# Patient Record
Sex: MALE | Race: WHITE | Employment: OTHER | URBAN - METROPOLITAN AREA
[De-identification: names, ages, dates, MRNs, and addresses within clinical notes are randomized per-mention and may not be internally consistent; named-entity substitution may affect disease eponyms.]

---

## 2024-08-14 RX ORDER — SODIUM CHLORIDE, SODIUM LACTATE, POTASSIUM CHLORIDE, CALCIUM CHLORIDE 600; 310; 30; 20 MG/100ML; MG/100ML; MG/100ML; MG/100ML
INJECTION, SOLUTION INTRAVENOUS CONTINUOUS
Status: CANCELLED | OUTPATIENT
Start: 2024-08-14

## 2024-08-14 NOTE — DISCHARGE INSTRUCTIONS
Pre-operative Instructions           NOTHING to eat or drink after midnight the night prior to surgery   (This includes gum, candy, mints, chewing tobacco, etc). Smoking cessation is always advised.   (Follow bowel prep or diet instructions as/if instructed by your surgeon.)    Please arrive at the surgery center (Entrance B) by 10:30 AM on 8/19/2024 (or as directed by your surgeon's office).  See Directons to Surgery Center on next page.     Please take only the following medication(s) the day of surgery with a small sip of water: gabapentin (Neurontin), metoprolol (Lopressor)    No trulicity injection within one week of surgery.     Please hold lisinopril morning of surgery, or night prior to surgery if you take it at night.    If applicable:   -Use/bring daily inhalers with you   -Do not take diabetic medications on the day of surgery.    Please stop any blood thinning medications  AS DIRECTED BY PRESCRIBING PROVIDER: aspirin and effient    Per your cardiologist: Okay to hold aspirin and Effient 5-7 days prior to surgery     Failure to stop these medications as instructed (too soon or too late) may result in injury to you, or your surgery may need to be rescheduled.   Below is a list of some examples for your reference. This is not an all-inclusive list and if you have any questions/concerns, please check with your surgeon's office.     Antiplatelets : (stop blood cells (called platelets) from sticking together and forming a blood clot):   Aspirin (Bufferin, Ecotrin), Clopidogrel (Plavix), Ticagrelor (Brilinta), Prasugrel (Effient), Dipyridamole/aspirin (Aggrenox), Ticlopidine (Ticlid), Eptifibatide (Integrilin)    Anticoagulants: (slow down your body's process of making clots):   Warfarin (Coumadin), Rivaroxaban (Xarelto), Dabigatran (Pradaxa), Apixaban (Eliquis), Edoxaban (Savaysa), Heparin, Enoxaparin (Lovenox), Fondaparinux (Arixtra)    NSAIDS: Aspirin (Bufferin, Ecotrin), Ibuprofen (Motrin, Nuprin,Advil),

## 2024-08-15 ENCOUNTER — HOSPITAL ENCOUNTER (OUTPATIENT)
Dept: PREADMISSION TESTING | Age: 79
Discharge: HOME | End: 2024-08-15
Payer: MEDICARE

## 2024-08-15 VITALS
TEMPERATURE: 98.2 F | WEIGHT: 198 LBS | HEIGHT: 74 IN | HEART RATE: 83 BPM | SYSTOLIC BLOOD PRESSURE: 119 MMHG | OXYGEN SATURATION: 95 % | RESPIRATION RATE: 16 BRPM | DIASTOLIC BLOOD PRESSURE: 64 MMHG | BODY MASS INDEX: 25.41 KG/M2

## 2024-08-15 LAB
ABO + RH BLD: NORMAL
ANION GAP SERPL CALCULATED.3IONS-SCNC: 8 MMOL/L (ref 9–16)
ARM BAND NUMBER: NORMAL
BLOOD BANK SAMPLE EXPIRATION: NORMAL
BLOOD GROUP ANTIBODIES SERPL: NEGATIVE
BUN SERPL-MCNC: 32 MG/DL (ref 8–23)
CHLORIDE SERPL-SCNC: 106 MMOL/L (ref 98–107)
CO2 SERPL-SCNC: 27 MMOL/L (ref 20–31)
CREAT SERPL-MCNC: 2.6 MG/DL (ref 0.7–1.2)
ERYTHROCYTE [DISTWIDTH] IN BLOOD BY AUTOMATED COUNT: 15.7 % (ref 11.8–14.4)
GFR, ESTIMATED: 24 ML/MIN/1.73M2
GLUCOSE SERPL-MCNC: 194 MG/DL (ref 74–99)
HCT VFR BLD AUTO: 36.9 % (ref 40.7–50.3)
HGB BLD-MCNC: 12.5 G/DL (ref 13–17)
MCH RBC QN AUTO: 31.8 PG (ref 25.2–33.5)
MCHC RBC AUTO-ENTMCNC: 33.9 G/DL (ref 28.4–34.8)
MCV RBC AUTO: 93.9 FL (ref 82.6–102.9)
NRBC BLD-RTO: 0 PER 100 WBC
PLATELET # BLD AUTO: 205 K/UL (ref 138–453)
PMV BLD AUTO: 9.2 FL (ref 8.1–13.5)
POTASSIUM SERPL-SCNC: 4.7 MMOL/L (ref 3.7–5.3)
RBC # BLD AUTO: 3.93 M/UL (ref 4.21–5.77)
SODIUM SERPL-SCNC: 141 MMOL/L (ref 136–145)
WBC OTHER # BLD: 6.9 K/UL (ref 3.5–11.3)

## 2024-08-15 PROCEDURE — 85027 COMPLETE CBC AUTOMATED: CPT

## 2024-08-15 PROCEDURE — 80051 ELECTROLYTE PANEL: CPT

## 2024-08-15 PROCEDURE — 86900 BLOOD TYPING SEROLOGIC ABO: CPT

## 2024-08-15 PROCEDURE — 82565 ASSAY OF CREATININE: CPT

## 2024-08-15 PROCEDURE — 82947 ASSAY GLUCOSE BLOOD QUANT: CPT

## 2024-08-15 PROCEDURE — 86901 BLOOD TYPING SEROLOGIC RH(D): CPT

## 2024-08-15 PROCEDURE — 84520 ASSAY OF UREA NITROGEN: CPT

## 2024-08-15 PROCEDURE — 93005 ELECTROCARDIOGRAM TRACING: CPT | Performed by: ANESTHESIOLOGY

## 2024-08-15 PROCEDURE — 86850 RBC ANTIBODY SCREEN: CPT

## 2024-08-15 PROCEDURE — 87086 URINE CULTURE/COLONY COUNT: CPT

## 2024-08-15 RX ORDER — ASPIRIN 81 MG/1
81 TABLET ORAL
Status: ON HOLD | COMMUNITY
Start: 2015-08-07

## 2024-08-15 RX ORDER — INSULIN LISPRO 100 [IU]/ML
INJECTION, SOLUTION INTRAVENOUS; SUBCUTANEOUS
Status: ON HOLD | COMMUNITY
Start: 2017-05-23

## 2024-08-15 RX ORDER — DULAGLUTIDE 0.75 MG/.5ML
0.75 INJECTION, SOLUTION SUBCUTANEOUS WEEKLY
Status: ON HOLD | COMMUNITY
Start: 2023-05-12

## 2024-08-15 RX ORDER — LISINOPRIL 5 MG/1
5 TABLET ORAL DAILY
Status: ON HOLD | COMMUNITY
Start: 2024-05-08

## 2024-08-15 RX ORDER — PRASUGREL 10 MG/1
1 TABLET, FILM COATED ORAL EVERY MORNING
Status: ON HOLD | COMMUNITY
Start: 2024-04-25

## 2024-08-15 RX ORDER — GABAPENTIN 600 MG/1
600 TABLET ORAL 2 TIMES DAILY
Status: ON HOLD | COMMUNITY
Start: 2024-02-12

## 2024-08-15 RX ORDER — NITROGLYCERIN 0.4 MG/1
0.4 TABLET SUBLINGUAL EVERY 5 MIN PRN
Status: ON HOLD | COMMUNITY
Start: 2022-03-11

## 2024-08-15 RX ORDER — INSULIN GLARGINE 100 [IU]/ML
20 INJECTION, SOLUTION SUBCUTANEOUS 2 TIMES DAILY
Status: ON HOLD | COMMUNITY
Start: 2024-08-05

## 2024-08-15 NOTE — PROGRESS NOTES
Anesthesia Focused Assessment    Hx of anesthesia complications:  no  Family hx of anesthesia complications:  no      Tested positive for Covid-19 in last 8 weeks: no  Upper respiratory infection within the last 4 weeks: no      STOP-BANG Sleep Apnea Questionnaire    SNORE loudly (heard through closed doors)?   Yes  TIRED, fatigued, sleepy during daytime?    Yes  OBSERVED stopping breathing during sleep?   Yes  High blood PRESSURE or being treated?    Yes    BMI over 35?        No  AGE over 50?        Yes  NECK circumference over 16\"?     No  GENDER (male)?       Yes             Total 6  High risk 5-8  Intermediate risk 3-4  Low risk 0-2    ----------------------------------------------------------------------------------------------------------------------  NICOLE                              Yes  If yes, machine?      No    DM1                                            No  DM2                   Yes, on meds    Coronary Artery Disease      Yes, 1 stent placed in 2022  HTN         Yes, on meds  Defib/AICD/Pacemaker               No             Renal Failure                   No  If yes, on dialysis           Active smoker?       No  Drinks alcohol?       No  Illicit drugs?        No  Dentition?        benign      Past Medical History:   Diagnosis Date    Angina pectoris (HCC)     Arthritis     ASCVD (arteriosclerotic cardiovascular disease)     CAD (coronary artery disease)     CKD (chronic kidney disease), stage III (HCC)     First degree AV block     Hypertension     Kidney stone     Left renal mass 08/2024    Mixed hyperlipidemia     Murmur, cardiac     Right upper lobe pneumonia 06/2024    [facility]    Shortness of breath     Sleep apnea     no CPAP    Status post insertion of drug-eluting stent into left anterior descending (LAD) artery 03/28/2022    Type 2 diabetes mellitus with diabetic chronic kidney disease (HCC)     managed by PCP    Under care of team     Cardiologist: Parth Kwok MD,

## 2024-08-15 NOTE — H&P (VIEW-ONLY)
History and Physical    Pt Name: Randy Caputo  MRN: [id number]  YOB: 1945  Date of evaluation: 8/15/2024  Primary Care Physician: Humberto Craig MD    SUBJECTIVE:   History of Chief Complaint:    Randy Caputo is a 79 y.o. male who presents for PAT appointment. Patient complains of hematuria, ongoing for about two weeks, as well as left abdominal discomfort. He denies any urinary frequency, urgency, or flank pain. He state on imaging study, he was found to have kidney stones. Patient has been scheduled for     XI ROBOTIC LAPAROSCOPIC RADICAL NEPHRECTOMY, CYSTOSCOPY LEFT STENT REMOVAL     Allergies  has No Known Allergies.  Medications  Prior to Admission medications    Medication Sig Start Date End Date Taking? Authorizing Provider   aspirin (ASPIRIN 81) 81 MG EC tablet 1 tablet 8/7/15  Yes Efrain Swift MD   gabapentin (NEURONTIN) 600 MG tablet Take 1 tablet by mouth 2 times daily. 2/12/24  Yes Efrain Swift MD   dulaglutide (TRULICITY) 0.75 MG/0.5ML SOPN SC injection Inject 0.5 mLs into the skin once a week 5/12/23  Yes Efrain Swift MD   ATORVASTATIN CALCIUM PO Take 10 mg by mouth daily 2/20/14  Yes Efrain Swift MD   vitamin D (D3) 25 MCG (1000 UT) CAPS Take by mouth daily   Yes Efrain Swift MD   metoprolol tartrate (LOPRESSOR) 25 MG tablet Take 0.5 tablets by mouth 2 times daily Hold is systolic BP is less than 110 8/5/24  Yes Efrain Swift MD   lisinopril (PRINIVIL;ZESTRIL) 5 MG tablet Take 1 tablet by mouth daily 5/8/24  Yes Efrain Swift MD   insulin glargine (LANTUS) 100 UNIT/ML injection vial Inject 20 Units into the skin 2 times daily 8/5/24  Yes Efrain Swift MD   insulin lispro, 1 Unit Dial, (HUMALOG/ADMELOG) 100 UNIT/ML SOPN Inject into the skin 3 times daily (before meals) Sliding scale 5/23/17  Yes Efrain Swift MD   nitroGLYCERIN (NITROSTAT) 0.4 MG SL tablet Place 1 tablet under the tongue every 5 minutes

## 2024-08-15 NOTE — H&P
History and Physical    Pt Name: Randy Caputo  MRN: [id number]  YOB: 1945  Date of evaluation: 8/15/2024  Primary Care Physician: Humberto Craig MD    SUBJECTIVE:   History of Chief Complaint:    Randy Caputo is a 79 y.o. male who presents for PAT appointment. Patient complains of hematuria, ongoing for about two weeks, as well as left abdominal discomfort. He denies any urinary frequency, urgency, or flank pain. He state on imaging study, he was found to have kidney stones. Patient has been scheduled for     XI ROBOTIC LAPAROSCOPIC RADICAL NEPHRECTOMY, CYSTOSCOPY LEFT STENT REMOVAL     Allergies  has No Known Allergies.  Medications  Prior to Admission medications    Medication Sig Start Date End Date Taking? Authorizing Provider   aspirin (ASPIRIN 81) 81 MG EC tablet 1 tablet 8/7/15  Yes Efrain Swift MD   gabapentin (NEURONTIN) 600 MG tablet Take 1 tablet by mouth 2 times daily. 2/12/24  Yes Efrain Swift MD   dulaglutide (TRULICITY) 0.75 MG/0.5ML SOPN SC injection Inject 0.5 mLs into the skin once a week 5/12/23  Yes Efrain Swift MD   ATORVASTATIN CALCIUM PO Take 10 mg by mouth daily 2/20/14  Yes Efrain Swift MD   vitamin D (D3) 25 MCG (1000 UT) CAPS Take by mouth daily   Yes Efrain Swift MD   metoprolol tartrate (LOPRESSOR) 25 MG tablet Take 0.5 tablets by mouth 2 times daily Hold is systolic BP is less than 110 8/5/24  Yes Efrain Swift MD   lisinopril (PRINIVIL;ZESTRIL) 5 MG tablet Take 1 tablet by mouth daily 5/8/24  Yes Efrain Swift MD   insulin glargine (LANTUS) 100 UNIT/ML injection vial Inject 20 Units into the skin 2 times daily 8/5/24  Yes Efrain Swift MD   insulin lispro, 1 Unit Dial, (HUMALOG/ADMELOG) 100 UNIT/ML SOPN Inject into the skin 3 times daily (before meals) Sliding scale 5/23/17  Yes Efrain Swift MD   nitroGLYCERIN (NITROSTAT) 0.4 MG SL tablet Place 1 tablet under the tongue every 5 minutes

## 2024-08-16 LAB
EKG ATRIAL RATE: 78 BPM
EKG P AXIS: 39 DEGREES
EKG P-R INTERVAL: 226 MS
EKG Q-T INTERVAL: 410 MS
EKG QRS DURATION: 86 MS
EKG QTC CALCULATION (BAZETT): 467 MS
EKG R AXIS: 57 DEGREES
EKG T AXIS: 55 DEGREES
EKG VENTRICULAR RATE: 78 BPM
MICROORGANISM SPEC CULT: NORMAL
SPECIMEN DESCRIPTION: NORMAL

## 2024-08-16 RX ORDER — ENOXAPARIN SODIUM 100 MG/ML
40 INJECTION SUBCUTANEOUS ONCE
Status: CANCELLED | OUTPATIENT
Start: 2024-08-16 | End: 2024-08-16

## 2024-08-19 ENCOUNTER — ANESTHESIA (OUTPATIENT)
Dept: OPERATING ROOM | Age: 79
End: 2024-08-19
Payer: MEDICARE

## 2024-08-19 ENCOUNTER — ANESTHESIA EVENT (OUTPATIENT)
Dept: OPERATING ROOM | Age: 79
End: 2024-08-19
Payer: MEDICARE

## 2024-08-19 ENCOUNTER — HOSPITAL ENCOUNTER (INPATIENT)
Age: 79
LOS: 8 days | Discharge: NURSING, REHAB OR LONG-TERM CARE | DRG: 657 | End: 2024-08-27
Attending: UROLOGY | Admitting: UROLOGY
Payer: MEDICARE

## 2024-08-19 DIAGNOSIS — G89.18 POST-OP PAIN: Primary | ICD-10-CM

## 2024-08-19 DIAGNOSIS — N28.89 RENAL MASS: ICD-10-CM

## 2024-08-19 LAB
ANION GAP SERPL CALCULATED.3IONS-SCNC: 16 MMOL/L (ref 9–16)
BUN SERPL-MCNC: 34 MG/DL (ref 8–23)
CALCIUM SERPL-MCNC: 7.9 MG/DL (ref 8.6–10.4)
CHLORIDE SERPL-SCNC: 105 MMOL/L (ref 98–107)
CO2 SERPL-SCNC: 13 MMOL/L (ref 20–31)
CREAT SERPL-MCNC: 2.6 MG/DL (ref 0.7–1.2)
ERYTHROCYTE [DISTWIDTH] IN BLOOD BY AUTOMATED COUNT: 15.7 % (ref 11.8–14.4)
GFR, ESTIMATED: 25 ML/MIN/1.73M2
GLUCOSE BLD-MCNC: 189 MG/DL (ref 74–100)
GLUCOSE BLD-MCNC: 226 MG/DL (ref 75–110)
GLUCOSE SERPL-MCNC: 226 MG/DL (ref 74–99)
HCT VFR BLD AUTO: 41.6 % (ref 40.7–50.3)
HGB BLD-MCNC: 12.2 G/DL (ref 13–17)
MCH RBC QN AUTO: 30.7 PG (ref 25.2–33.5)
MCHC RBC AUTO-ENTMCNC: 29.3 G/DL (ref 28.4–34.8)
MCV RBC AUTO: 104.5 FL (ref 82.6–102.9)
NRBC BLD-RTO: 0 PER 100 WBC
PLATELET # BLD AUTO: 198 K/UL (ref 138–453)
PMV BLD AUTO: 9.2 FL (ref 8.1–13.5)
POTASSIUM BLD-SCNC: 4.4 MMOL/L (ref 3.5–4.5)
POTASSIUM SERPL-SCNC: 5.3 MMOL/L (ref 3.7–5.3)
RBC # BLD AUTO: 3.98 M/UL (ref 4.21–5.77)
SODIUM SERPL-SCNC: 134 MMOL/L (ref 136–145)
WBC OTHER # BLD: 13.5 K/UL (ref 3.5–11.3)

## 2024-08-19 PROCEDURE — 6360000002 HC RX W HCPCS

## 2024-08-19 PROCEDURE — 88341 IMHCHEM/IMCYTCHM EA ADD ANTB: CPT

## 2024-08-19 PROCEDURE — S2900 ROBOTIC SURGICAL SYSTEM: HCPCS | Performed by: UROLOGY

## 2024-08-19 PROCEDURE — 3600000019 HC SURGERY ROBOT ADDTL 15MIN: Performed by: UROLOGY

## 2024-08-19 PROCEDURE — 80048 BASIC METABOLIC PNL TOTAL CA: CPT

## 2024-08-19 PROCEDURE — 6360000002 HC RX W HCPCS: Performed by: UROLOGY

## 2024-08-19 PROCEDURE — 6370000000 HC RX 637 (ALT 250 FOR IP)

## 2024-08-19 PROCEDURE — 2709999900 HC NON-CHARGEABLE SUPPLY: Performed by: UROLOGY

## 2024-08-19 PROCEDURE — 0TP98DZ REMOVAL OF INTRALUMINAL DEVICE FROM URETER, VIA NATURAL OR ARTIFICIAL OPENING ENDOSCOPIC: ICD-10-PCS | Performed by: UROLOGY

## 2024-08-19 PROCEDURE — 2580000003 HC RX 258

## 2024-08-19 PROCEDURE — 8E0W4CZ ROBOTIC ASSISTED PROCEDURE OF TRUNK REGION, PERCUTANEOUS ENDOSCOPIC APPROACH: ICD-10-PCS | Performed by: UROLOGY

## 2024-08-19 PROCEDURE — 3700000001 HC ADD 15 MINUTES (ANESTHESIA): Performed by: UROLOGY

## 2024-08-19 PROCEDURE — 2720000010 HC SURG SUPPLY STERILE: Performed by: UROLOGY

## 2024-08-19 PROCEDURE — 7100000000 HC PACU RECOVERY - FIRST 15 MIN: Performed by: UROLOGY

## 2024-08-19 PROCEDURE — 2500000003 HC RX 250 WO HCPCS

## 2024-08-19 PROCEDURE — 85027 COMPLETE CBC AUTOMATED: CPT

## 2024-08-19 PROCEDURE — 84132 ASSAY OF SERUM POTASSIUM: CPT

## 2024-08-19 PROCEDURE — 3700000000 HC ANESTHESIA ATTENDED CARE: Performed by: UROLOGY

## 2024-08-19 PROCEDURE — 88342 IMHCHEM/IMCYTCHM 1ST ANTB: CPT

## 2024-08-19 PROCEDURE — 82947 ASSAY GLUCOSE BLOOD QUANT: CPT

## 2024-08-19 PROCEDURE — 2060000000 HC ICU INTERMEDIATE R&B

## 2024-08-19 PROCEDURE — 3600000009 HC SURGERY ROBOT BASE: Performed by: UROLOGY

## 2024-08-19 PROCEDURE — 88307 TISSUE EXAM BY PATHOLOGIST: CPT

## 2024-08-19 PROCEDURE — 2580000003 HC RX 258: Performed by: UROLOGY

## 2024-08-19 PROCEDURE — 0TT14ZZ RESECTION OF LEFT KIDNEY, PERCUTANEOUS ENDOSCOPIC APPROACH: ICD-10-PCS | Performed by: UROLOGY

## 2024-08-19 PROCEDURE — 2580000003 HC RX 258: Performed by: ANESTHESIOLOGY

## 2024-08-19 PROCEDURE — 7100000001 HC PACU RECOVERY - ADDTL 15 MIN: Performed by: UROLOGY

## 2024-08-19 PROCEDURE — 88313 SPECIAL STAINS GROUP 2: CPT

## 2024-08-19 RX ORDER — ENOXAPARIN SODIUM 100 MG/ML
40 INJECTION SUBCUTANEOUS ONCE
Status: DISCONTINUED | OUTPATIENT
Start: 2024-08-19 | End: 2024-08-19

## 2024-08-19 RX ORDER — INSULIN LISPRO 100 [IU]/ML
0-8 INJECTION, SOLUTION INTRAVENOUS; SUBCUTANEOUS
Status: DISCONTINUED | OUTPATIENT
Start: 2024-08-19 | End: 2024-08-27 | Stop reason: HOSPADM

## 2024-08-19 RX ORDER — CEFAZOLIN SODIUM 1 G/3ML
INJECTION, POWDER, FOR SOLUTION INTRAMUSCULAR; INTRAVENOUS PRN
Status: DISCONTINUED | OUTPATIENT
Start: 2024-08-19 | End: 2024-08-19 | Stop reason: SDUPTHER

## 2024-08-19 RX ORDER — IPRATROPIUM BROMIDE AND ALBUTEROL SULFATE 2.5; .5 MG/3ML; MG/3ML
1 SOLUTION RESPIRATORY (INHALATION)
Status: DISCONTINUED | OUTPATIENT
Start: 2024-08-19 | End: 2024-08-19 | Stop reason: HOSPADM

## 2024-08-19 RX ORDER — ONDANSETRON 4 MG/1
4 TABLET, ORALLY DISINTEGRATING ORAL EVERY 8 HOURS PRN
Status: DISCONTINUED | OUTPATIENT
Start: 2024-08-19 | End: 2024-08-27 | Stop reason: HOSPADM

## 2024-08-19 RX ORDER — LABETALOL HYDROCHLORIDE 5 MG/ML
10 INJECTION, SOLUTION INTRAVENOUS
Status: DISCONTINUED | OUTPATIENT
Start: 2024-08-19 | End: 2024-08-19 | Stop reason: HOSPADM

## 2024-08-19 RX ORDER — HEPARIN SODIUM 5000 [USP'U]/ML
5000 INJECTION, SOLUTION INTRAVENOUS; SUBCUTANEOUS ONCE
Status: COMPLETED | OUTPATIENT
Start: 2024-08-19 | End: 2024-08-19

## 2024-08-19 RX ORDER — POTASSIUM CHLORIDE 1500 MG/1
40 TABLET, EXTENDED RELEASE ORAL PRN
Status: DISCONTINUED | OUTPATIENT
Start: 2024-08-19 | End: 2024-08-19

## 2024-08-19 RX ORDER — GLYCOPYRROLATE 1 MG/5 ML
SYRINGE (ML) INTRAVENOUS PRN
Status: DISCONTINUED | OUTPATIENT
Start: 2024-08-19 | End: 2024-08-19 | Stop reason: SDUPTHER

## 2024-08-19 RX ORDER — SODIUM CHLORIDE, SODIUM LACTATE, POTASSIUM CHLORIDE, CALCIUM CHLORIDE 600; 310; 30; 20 MG/100ML; MG/100ML; MG/100ML; MG/100ML
INJECTION, SOLUTION INTRAVENOUS CONTINUOUS
Status: DISCONTINUED | OUTPATIENT
Start: 2024-08-19 | End: 2024-08-19 | Stop reason: HOSPADM

## 2024-08-19 RX ORDER — SODIUM CHLORIDE 9 MG/ML
INJECTION, SOLUTION INTRAVENOUS CONTINUOUS
Status: DISCONTINUED | OUTPATIENT
Start: 2024-08-19 | End: 2024-08-27 | Stop reason: HOSPADM

## 2024-08-19 RX ORDER — MINERAL OIL AND WHITE PETROLATUM 150; 830 MG/G; MG/G
OINTMENT OPHTHALMIC PRN
Status: DISCONTINUED | OUTPATIENT
Start: 2024-08-19 | End: 2024-08-27 | Stop reason: HOSPADM

## 2024-08-19 RX ORDER — SODIUM CHLORIDE 9 MG/ML
INJECTION, SOLUTION INTRAVENOUS CONTINUOUS PRN
Status: DISCONTINUED | OUTPATIENT
Start: 2024-08-19 | End: 2024-08-19 | Stop reason: SDUPTHER

## 2024-08-19 RX ORDER — OXYCODONE HYDROCHLORIDE 5 MG/1
10 TABLET ORAL EVERY 4 HOURS PRN
Status: DISCONTINUED | OUTPATIENT
Start: 2024-08-19 | End: 2024-08-27 | Stop reason: HOSPADM

## 2024-08-19 RX ORDER — SODIUM CHLORIDE 9 MG/ML
INJECTION, SOLUTION INTRAVENOUS PRN
Status: DISCONTINUED | OUTPATIENT
Start: 2024-08-19 | End: 2024-08-19 | Stop reason: HOSPADM

## 2024-08-19 RX ORDER — ONDANSETRON 2 MG/ML
INJECTION INTRAMUSCULAR; INTRAVENOUS PRN
Status: DISCONTINUED | OUTPATIENT
Start: 2024-08-19 | End: 2024-08-19 | Stop reason: SDUPTHER

## 2024-08-19 RX ORDER — PHENYLEPHRINE HCL IN 0.9% NACL 1 MG/10 ML
SYRINGE (ML) INTRAVENOUS PRN
Status: DISCONTINUED | OUTPATIENT
Start: 2024-08-19 | End: 2024-08-19 | Stop reason: SDUPTHER

## 2024-08-19 RX ORDER — BUPIVACAINE HYDROCHLORIDE 5 MG/ML
INJECTION, SOLUTION EPIDURAL; INTRACAUDAL PRN
Status: DISCONTINUED | OUTPATIENT
Start: 2024-08-19 | End: 2024-08-19 | Stop reason: HOSPADM

## 2024-08-19 RX ORDER — OXYCODONE HYDROCHLORIDE 5 MG/1
5 TABLET ORAL EVERY 4 HOURS PRN
Status: DISCONTINUED | OUTPATIENT
Start: 2024-08-19 | End: 2024-08-27 | Stop reason: HOSPADM

## 2024-08-19 RX ORDER — HYDRALAZINE HYDROCHLORIDE 20 MG/ML
10 INJECTION INTRAMUSCULAR; INTRAVENOUS
Status: DISCONTINUED | OUTPATIENT
Start: 2024-08-19 | End: 2024-08-19 | Stop reason: HOSPADM

## 2024-08-19 RX ORDER — MORPHINE SULFATE 2 MG/ML
2 INJECTION, SOLUTION INTRAMUSCULAR; INTRAVENOUS
Status: DISCONTINUED | OUTPATIENT
Start: 2024-08-19 | End: 2024-08-27 | Stop reason: HOSPADM

## 2024-08-19 RX ORDER — DOCUSATE SODIUM 100 MG/1
100 CAPSULE, LIQUID FILLED ORAL 2 TIMES DAILY
Status: DISCONTINUED | OUTPATIENT
Start: 2024-08-19 | End: 2024-08-27 | Stop reason: HOSPADM

## 2024-08-19 RX ORDER — DIPHENHYDRAMINE HYDROCHLORIDE 50 MG/ML
12.5 INJECTION INTRAMUSCULAR; INTRAVENOUS
Status: DISCONTINUED | OUTPATIENT
Start: 2024-08-19 | End: 2024-08-19 | Stop reason: HOSPADM

## 2024-08-19 RX ORDER — GABAPENTIN 600 MG/1
600 TABLET ORAL 2 TIMES DAILY
Status: DISCONTINUED | OUTPATIENT
Start: 2024-08-19 | End: 2024-08-23

## 2024-08-19 RX ORDER — HYDRALAZINE HYDROCHLORIDE 20 MG/ML
5 INJECTION INTRAMUSCULAR; INTRAVENOUS EVERY 6 HOURS PRN
Status: DISCONTINUED | OUTPATIENT
Start: 2024-08-19 | End: 2024-08-27 | Stop reason: HOSPADM

## 2024-08-19 RX ORDER — SODIUM CHLORIDE 0.9 % (FLUSH) 0.9 %
5-40 SYRINGE (ML) INJECTION PRN
Status: DISCONTINUED | OUTPATIENT
Start: 2024-08-19 | End: 2024-08-19 | Stop reason: HOSPADM

## 2024-08-19 RX ORDER — NALOXONE HYDROCHLORIDE 0.4 MG/ML
INJECTION, SOLUTION INTRAMUSCULAR; INTRAVENOUS; SUBCUTANEOUS PRN
Status: DISCONTINUED | OUTPATIENT
Start: 2024-08-19 | End: 2024-08-19 | Stop reason: HOSPADM

## 2024-08-19 RX ORDER — SODIUM CHLORIDE 9 MG/ML
INJECTION, SOLUTION INTRAVENOUS PRN
Status: DISCONTINUED | OUTPATIENT
Start: 2024-08-19 | End: 2024-08-27 | Stop reason: HOSPADM

## 2024-08-19 RX ORDER — PROPOFOL 10 MG/ML
INJECTION, EMULSION INTRAVENOUS PRN
Status: DISCONTINUED | OUTPATIENT
Start: 2024-08-19 | End: 2024-08-19 | Stop reason: SDUPTHER

## 2024-08-19 RX ORDER — INSULIN GLARGINE 100 [IU]/ML
20 INJECTION, SOLUTION SUBCUTANEOUS 2 TIMES DAILY
Status: DISCONTINUED | OUTPATIENT
Start: 2024-08-19 | End: 2024-08-27 | Stop reason: HOSPADM

## 2024-08-19 RX ORDER — SODIUM CHLORIDE 0.9 % (FLUSH) 0.9 %
5-40 SYRINGE (ML) INJECTION EVERY 12 HOURS SCHEDULED
Status: DISCONTINUED | OUTPATIENT
Start: 2024-08-19 | End: 2024-08-19 | Stop reason: HOSPADM

## 2024-08-19 RX ORDER — METOPROLOL TARTRATE 25 MG/1
12.5 TABLET, FILM COATED ORAL 2 TIMES DAILY
Status: DISCONTINUED | OUTPATIENT
Start: 2024-08-19 | End: 2024-08-27 | Stop reason: HOSPADM

## 2024-08-19 RX ORDER — FENTANYL CITRATE 50 UG/ML
INJECTION, SOLUTION INTRAMUSCULAR; INTRAVENOUS PRN
Status: DISCONTINUED | OUTPATIENT
Start: 2024-08-19 | End: 2024-08-19 | Stop reason: SDUPTHER

## 2024-08-19 RX ORDER — LIDOCAINE HYDROCHLORIDE 10 MG/ML
INJECTION, SOLUTION EPIDURAL; INFILTRATION; INTRACAUDAL; PERINEURAL PRN
Status: DISCONTINUED | OUTPATIENT
Start: 2024-08-19 | End: 2024-08-19 | Stop reason: SDUPTHER

## 2024-08-19 RX ORDER — METOPROLOL TARTRATE 1 MG/ML
5 INJECTION, SOLUTION INTRAVENOUS EVERY 6 HOURS PRN
Status: DISCONTINUED | OUTPATIENT
Start: 2024-08-19 | End: 2024-08-27 | Stop reason: HOSPADM

## 2024-08-19 RX ORDER — ONDANSETRON 2 MG/ML
4 INJECTION INTRAMUSCULAR; INTRAVENOUS EVERY 6 HOURS PRN
Status: DISCONTINUED | OUTPATIENT
Start: 2024-08-19 | End: 2024-08-27 | Stop reason: HOSPADM

## 2024-08-19 RX ORDER — ROCURONIUM BROMIDE 10 MG/ML
INJECTION, SOLUTION INTRAVENOUS PRN
Status: DISCONTINUED | OUTPATIENT
Start: 2024-08-19 | End: 2024-08-19 | Stop reason: SDUPTHER

## 2024-08-19 RX ORDER — INSULIN LISPRO 100 [IU]/ML
0-4 INJECTION, SOLUTION INTRAVENOUS; SUBCUTANEOUS NIGHTLY
Status: DISCONTINUED | OUTPATIENT
Start: 2024-08-19 | End: 2024-08-27 | Stop reason: HOSPADM

## 2024-08-19 RX ORDER — METHOCARBAMOL 500 MG/1
500 TABLET, FILM COATED ORAL 4 TIMES DAILY
Status: DISCONTINUED | OUTPATIENT
Start: 2024-08-19 | End: 2024-08-27 | Stop reason: HOSPADM

## 2024-08-19 RX ORDER — MAGNESIUM HYDROXIDE 1200 MG/15ML
LIQUID ORAL CONTINUOUS PRN
Status: DISCONTINUED | OUTPATIENT
Start: 2024-08-19 | End: 2024-08-19 | Stop reason: HOSPADM

## 2024-08-19 RX ORDER — DEXAMETHASONE SODIUM PHOSPHATE 10 MG/ML
INJECTION INTRAMUSCULAR; INTRAVENOUS PRN
Status: DISCONTINUED | OUTPATIENT
Start: 2024-08-19 | End: 2024-08-19 | Stop reason: SDUPTHER

## 2024-08-19 RX ORDER — POTASSIUM CHLORIDE 7.45 MG/ML
10 INJECTION INTRAVENOUS PRN
Status: DISCONTINUED | OUTPATIENT
Start: 2024-08-19 | End: 2024-08-19

## 2024-08-19 RX ORDER — SODIUM CHLORIDE 0.9 % (FLUSH) 0.9 %
5-40 SYRINGE (ML) INJECTION PRN
Status: DISCONTINUED | OUTPATIENT
Start: 2024-08-19 | End: 2024-08-27 | Stop reason: HOSPADM

## 2024-08-19 RX ORDER — SODIUM CHLORIDE 0.9 % (FLUSH) 0.9 %
5-40 SYRINGE (ML) INJECTION EVERY 12 HOURS SCHEDULED
Status: DISCONTINUED | OUTPATIENT
Start: 2024-08-19 | End: 2024-08-27 | Stop reason: HOSPADM

## 2024-08-19 RX ORDER — LANOLIN ALCOHOL/MO/W.PET/CERES
6 CREAM (GRAM) TOPICAL NIGHTLY PRN
Status: DISCONTINUED | OUTPATIENT
Start: 2024-08-19 | End: 2024-08-27 | Stop reason: HOSPADM

## 2024-08-19 RX ORDER — PROCHLORPERAZINE EDISYLATE 5 MG/ML
5 INJECTION INTRAMUSCULAR; INTRAVENOUS
Status: DISCONTINUED | OUTPATIENT
Start: 2024-08-19 | End: 2024-08-19 | Stop reason: HOSPADM

## 2024-08-19 RX ORDER — CALCIUM CARBONATE 500 MG/1
500 TABLET, CHEWABLE ORAL 3 TIMES DAILY PRN
Status: DISCONTINUED | OUTPATIENT
Start: 2024-08-19 | End: 2024-08-27 | Stop reason: HOSPADM

## 2024-08-19 RX ADMIN — ONDANSETRON 4 MG: 2 INJECTION INTRAMUSCULAR; INTRAVENOUS at 15:30

## 2024-08-19 RX ADMIN — HYOSCYAMINE SULFATE 125 MCG: 0.12 TABLET SUBLINGUAL at 21:12

## 2024-08-19 RX ADMIN — Medication 100 MCG: at 14:25

## 2024-08-19 RX ADMIN — INSULIN LISPRO 2 UNITS: 100 INJECTION, SOLUTION INTRAVENOUS; SUBCUTANEOUS at 21:14

## 2024-08-19 RX ADMIN — ROCURONIUM BROMIDE 20 MG: 10 INJECTION, SOLUTION INTRAVENOUS at 14:48

## 2024-08-19 RX ADMIN — ROCURONIUM BROMIDE 30 MG: 10 INJECTION, SOLUTION INTRAVENOUS at 12:34

## 2024-08-19 RX ADMIN — METOPROLOL TARTRATE 12.5 MG: 25 TABLET, FILM COATED ORAL at 22:56

## 2024-08-19 RX ADMIN — ROCURONIUM BROMIDE 20 MG: 10 INJECTION, SOLUTION INTRAVENOUS at 13:11

## 2024-08-19 RX ADMIN — METHOCARBAMOL 500 MG: 500 TABLET ORAL at 21:31

## 2024-08-19 RX ADMIN — SODIUM CHLORIDE, POTASSIUM CHLORIDE, SODIUM LACTATE AND CALCIUM CHLORIDE: 600; 310; 30; 20 INJECTION, SOLUTION INTRAVENOUS at 11:29

## 2024-08-19 RX ADMIN — MORPHINE SULFATE 2 MG: 2 INJECTION, SOLUTION INTRAMUSCULAR; INTRAVENOUS at 23:39

## 2024-08-19 RX ADMIN — Medication 100 MCG: at 13:53

## 2024-08-19 RX ADMIN — ROCURONIUM BROMIDE 20 MG: 10 INJECTION, SOLUTION INTRAVENOUS at 14:15

## 2024-08-19 RX ADMIN — FENTANYL CITRATE 50 MCG: 50 INJECTION, SOLUTION INTRAMUSCULAR; INTRAVENOUS at 15:11

## 2024-08-19 RX ADMIN — ROCURONIUM BROMIDE 10 MG: 10 INJECTION, SOLUTION INTRAVENOUS at 13:29

## 2024-08-19 RX ADMIN — ROCURONIUM BROMIDE 40 MG: 10 INJECTION, SOLUTION INTRAVENOUS at 11:47

## 2024-08-19 RX ADMIN — DOCUSATE SODIUM 100 MG: 100 CAPSULE, LIQUID FILLED ORAL at 21:32

## 2024-08-19 RX ADMIN — LIDOCAINE HYDROCHLORIDE 50 MG: 10 INJECTION, SOLUTION EPIDURAL; INFILTRATION; INTRACAUDAL; PERINEURAL at 11:47

## 2024-08-19 RX ADMIN — SODIUM CHLORIDE: 9 INJECTION, SOLUTION INTRAVENOUS at 13:54

## 2024-08-19 RX ADMIN — FENTANYL CITRATE 50 MCG: 50 INJECTION, SOLUTION INTRAMUSCULAR; INTRAVENOUS at 13:58

## 2024-08-19 RX ADMIN — CEFAZOLIN 2 G: 1 INJECTION, POWDER, FOR SOLUTION INTRAMUSCULAR; INTRAVENOUS at 12:00

## 2024-08-19 RX ADMIN — PROPOFOL 200 MG: 10 INJECTION, EMULSION INTRAVENOUS at 11:47

## 2024-08-19 RX ADMIN — Medication 100 MCG: at 11:57

## 2024-08-19 RX ADMIN — SUGAMMADEX 400 MG: 100 INJECTION, SOLUTION INTRAVENOUS at 15:49

## 2024-08-19 RX ADMIN — FENTANYL CITRATE 100 MCG: 50 INJECTION, SOLUTION INTRAMUSCULAR; INTRAVENOUS at 11:47

## 2024-08-19 RX ADMIN — Medication 100 MCG: at 14:09

## 2024-08-19 RX ADMIN — SODIUM CHLORIDE: 9 INJECTION, SOLUTION INTRAVENOUS at 11:54

## 2024-08-19 RX ADMIN — HEPARIN SODIUM 5000 UNITS: 5000 INJECTION INTRAVENOUS; SUBCUTANEOUS at 11:32

## 2024-08-19 RX ADMIN — ROCURONIUM BROMIDE 10 MG: 10 INJECTION, SOLUTION INTRAVENOUS at 12:01

## 2024-08-19 RX ADMIN — Medication 100 MCG: at 12:09

## 2024-08-19 RX ADMIN — GABAPENTIN 600 MG: 600 TABLET, FILM COATED ORAL at 21:09

## 2024-08-19 RX ADMIN — DEXAMETHASONE SODIUM PHOSPHATE 10 MG: 10 INJECTION INTRAMUSCULAR; INTRAVENOUS at 12:01

## 2024-08-19 RX ADMIN — Medication 100 MCG: at 14:56

## 2024-08-19 RX ADMIN — INSULIN LISPRO 2 UNITS: 100 INJECTION, SOLUTION INTRAVENOUS; SUBCUTANEOUS at 17:54

## 2024-08-19 RX ADMIN — Medication 0.2 MG: at 12:33

## 2024-08-19 RX ADMIN — Medication 100 MCG: at 13:22

## 2024-08-19 RX ADMIN — OXYCODONE HYDROCHLORIDE 10 MG: 5 TABLET ORAL at 21:14

## 2024-08-19 RX ADMIN — Medication 100 MCG: at 13:37

## 2024-08-19 ASSESSMENT — PAIN DESCRIPTION - LOCATION
LOCATION: ABDOMEN

## 2024-08-19 ASSESSMENT — PAIN DESCRIPTION - DESCRIPTORS
DESCRIPTORS: CRAMPING
DESCRIPTORS: CRAMPING
DESCRIPTORS: ACHING

## 2024-08-19 ASSESSMENT — ENCOUNTER SYMPTOMS: SHORTNESS OF BREATH: 1

## 2024-08-19 ASSESSMENT — PAIN - FUNCTIONAL ASSESSMENT
PAIN_FUNCTIONAL_ASSESSMENT: NONE - DENIES PAIN
PAIN_FUNCTIONAL_ASSESSMENT: FACE, LEGS, ACTIVITY, CRY, AND CONSOLABILITY (FLACC)

## 2024-08-19 ASSESSMENT — PAIN SCALES - GENERAL
PAINLEVEL_OUTOF10: 4
PAINLEVEL_OUTOF10: 10
PAINLEVEL_OUTOF10: 9

## 2024-08-19 ASSESSMENT — PAIN DESCRIPTION - ORIENTATION
ORIENTATION: LOWER;LEFT
ORIENTATION: LEFT;LOWER;MID
ORIENTATION: LEFT;LOWER

## 2024-08-19 NOTE — OP NOTE
Operative Note      Patient: Randy Caputo  YOB: 1945  MRN: [id number]    Date of Procedure: 8/19/2024    Pre-Op Diagnosis Codes:      * Renal mass [N28.89]    Post-Op Diagnosis: Same       Procedures:  Cystoscopy  Left ureteral stent removal  Left robotic radical nephrectomy    Surgeon(s):  Chester Hargrove MD    Assistant:   First Assistant: Ashley Hidalgo  Resident: Gabe Mayo MD; Rocco Power MD    Anesthesia: General    Estimated Blood Loss (mL): 200     Complications: None    Specimens:   ID Type Source Tests Collected by Time Destination   A : LEFT KIDNEY Tissue Kidney SURGICAL PATHOLOGY Chester Hargrove MD 8/19/2024 1512        Implants:  * No implants in log *      Drains:   Urinary Catheter 08/19/24 Pittman (Active)       Findings:  7 cm upper pole left renal mass, radical nephrectomy performed without complications, adrenal sparing, good plane between kidney and adrenal gland.  Ureteral stent removed prior to nephrectomy.  Overall, 2 main renal arteries and veins, both taken with staple loads, large parasitic vein which was also taken with a staple load.  The ureter and lower pole were also stapled.  Overall, good hemostasis noted, Surgicel powder used around the upper pole.    Indication:  79-year-old male with a large left renal mass.  Decision was made for robotic assisted left radical nephrectomy after ureteral stent removal.  After risks, benefits, alternatives were discussed and the patient agreed to move forward with the procedures listed above.    Detailed Description of Procedure:   The patient was transferred from the preoperative holding area to the operative suite in supine position, placed on the operating table, general anesthesia induced, 5000 units subcutaneous heparin administered as well as 2 g of Ancef.  EPC cuffs on and running.  He was placed in dorsal lithotomy position, genitals prepped and draped in normal sterile fashion, timeout confirmed proper patient

## 2024-08-19 NOTE — ANESTHESIA POSTPROCEDURE EVALUATION
Department of Anesthesiology  Postprocedure Note    Patient: Randy Caputo  MRN: [id number]  YOB: 1945  Date of evaluation: 8/19/2024    Procedure Summary       Date: 08/19/24 Room / Location: [address]    Anesthesia Start: 1142 Anesthesia Stop: 1605    Procedures:       XI ROBOTIC LAPAROSCOPIC RADICAL NEPHRECTOMY (Kidney)      CYSTOSCOPY STENT REMOVAL (Left: Kidney) Diagnosis:       Renal mass      (Renal mass [N28.89])    Surgeons: Chester Hargrove MD Responsible Provider: Chrissy Pruitt MD    Anesthesia Type: general ASA Status: 3            Anesthesia Type: No value filed.    Aby Phase I: Aby Score: 8    Aby Phase II:      Anesthesia Post Evaluation    Patient location during evaluation: bedside  Patient participation: complete - patient participated  Level of consciousness: awake and awake and alert  Pain score: 1  Airway patency: patent  Nausea & Vomiting: no nausea and no vomiting  Cardiovascular status: blood pressure returned to baseline and hemodynamically stable  Respiratory status: acceptable  Hydration status: euvolemic  Pain management: adequate    No notable events documented.

## 2024-08-19 NOTE — ANESTHESIA PRE PROCEDURE
Department of Anesthesiology  Preprocedure Note       Name:  Randy Caputo   Age:  79 y.o.  :  1945                                          MRN:  [id number]         Date:  2024      Surgeon: Surgeon(s):  Chester Hargrove MD    Procedure: Procedure(s):  XI ROBOTIC LAPAROSCOPIC RADICAL NEPHRECTOMY  CYSTOSCOPY STENT REMOVAL    Medications prior to admission:   Prior to Admission medications    Medication Sig Start Date End Date Taking? Authorizing Provider   aspirin (ASPIRIN 81) 81 MG EC tablet 1 tablet Pt. stopped 24 for OR 24. 8/7/15  Yes Efrain Swift MD   gabapentin (NEURONTIN) 600 MG tablet Take 1 tablet by mouth 2 times daily. 24  Yes Efrain Swift MD   dulaglutide (TRULICITY) 0.75 MG/0.5ML SOPN SC injection Inject 0.5 mLs into the skin once a week 23  Yes Efrain Swift MD   ATORVASTATIN CALCIUM PO Take 10 mg by mouth daily 14  Yes ProviderEfrain MD   vitamin D (D3) 25 MCG (1000 UT) CAPS Take by mouth daily   Yes Provider, MD Efrain   metoprolol tartrate (LOPRESSOR) 25 MG tablet Take 0.5 tablets by mouth 2 times daily Hold is systolic BP is less than 110 24  Yes Efrain Swift MD   lisinopril (PRINIVIL;ZESTRIL) 5 MG tablet Take 1 tablet by mouth daily 24  Yes Efrain Swift MD   insulin glargine (LANTUS) 100 UNIT/ML injection vial Inject 20 Units into the skin 2 times daily 24  Yes Efrain Swift MD   insulin lispro, 1 Unit Dial, (HUMALOG/ADMELOG) 100 UNIT/ML SOPN Inject into the skin 3 times daily (before meals) Sliding scale 17  Yes ProviderEfrain MD   nitroGLYCERIN (NITROSTAT) 0.4 MG SL tablet Place 1 tablet under the tongue every 5 minutes as needed 3/11/22  Yes Efrain Swift MD   prasugrel (EFFIENT) 10 MG TABS Take 1 tablet by mouth every morning Pt. stopped 8-15-24 for OR 24. 24  Yes Efrain Swift MD   sertraline (ZOLOFT) 50 MG tablet 2 tablets 24  Yes

## 2024-08-19 NOTE — INTERVAL H&P NOTE
Update History & Physical    The patient's History and Physical of August 15, 2024 was reviewed with the patient and I examined the patient. There was no change. The surgical site was confirmed by the patient and me.     Plan: The risks, benefits, expected outcome, and alternative to the recommended procedure have been discussed with the patient. Patient understands and wants to proceed with the procedure.     Rocco Power MD  Urology Resident, PGY-4

## 2024-08-20 ENCOUNTER — APPOINTMENT (OUTPATIENT)
Dept: ULTRASOUND IMAGING | Age: 79
DRG: 657 | End: 2024-08-20
Attending: UROLOGY
Payer: MEDICARE

## 2024-08-20 LAB
ANION GAP SERPL CALCULATED.3IONS-SCNC: 12 MMOL/L (ref 9–16)
ANION GAP SERPL CALCULATED.3IONS-SCNC: 9 MMOL/L (ref 9–16)
BUN SERPL-MCNC: 36 MG/DL (ref 8–23)
CALCIUM SERPL-MCNC: 8.2 MG/DL (ref 8.6–10.4)
CHLORIDE SERPL-SCNC: 106 MMOL/L (ref 98–107)
CHLORIDE SERPL-SCNC: 106 MMOL/L (ref 98–107)
CO2 SERPL-SCNC: 17 MMOL/L (ref 20–31)
CO2 SERPL-SCNC: 19 MMOL/L (ref 20–31)
CREAT SERPL-MCNC: 2.6 MG/DL (ref 0.7–1.2)
ERYTHROCYTE [DISTWIDTH] IN BLOOD BY AUTOMATED COUNT: 15.7 % (ref 11.8–14.4)
ERYTHROCYTE [DISTWIDTH] IN BLOOD BY AUTOMATED COUNT: 15.9 % (ref 11.8–14.4)
GFR, ESTIMATED: 25 ML/MIN/1.73M2
GLUCOSE BLD-MCNC: 157 MG/DL (ref 75–110)
GLUCOSE BLD-MCNC: 175 MG/DL (ref 75–110)
GLUCOSE BLD-MCNC: 179 MG/DL (ref 75–110)
GLUCOSE BLD-MCNC: 186 MG/DL (ref 75–110)
GLUCOSE SERPL-MCNC: 181 MG/DL (ref 74–99)
HCT VFR BLD AUTO: 31.7 % (ref 40.7–50.3)
HCT VFR BLD AUTO: 33.4 % (ref 40.7–50.3)
HGB BLD-MCNC: 10.2 G/DL (ref 13–17)
HGB BLD-MCNC: 10.8 G/DL (ref 13–17)
MCH RBC QN AUTO: 31.2 PG (ref 25.2–33.5)
MCH RBC QN AUTO: 31.2 PG (ref 25.2–33.5)
MCHC RBC AUTO-ENTMCNC: 32.2 G/DL (ref 28.4–34.8)
MCHC RBC AUTO-ENTMCNC: 32.3 G/DL (ref 28.4–34.8)
MCV RBC AUTO: 96.5 FL (ref 82.6–102.9)
MCV RBC AUTO: 96.9 FL (ref 82.6–102.9)
NRBC BLD-RTO: 0 PER 100 WBC
NRBC BLD-RTO: 0 PER 100 WBC
PLATELET # BLD AUTO: 195 K/UL (ref 138–453)
PLATELET # BLD AUTO: 200 K/UL (ref 138–453)
PMV BLD AUTO: 9 FL (ref 8.1–13.5)
PMV BLD AUTO: 9.4 FL (ref 8.1–13.5)
POTASSIUM SERPL-SCNC: 4.7 MMOL/L (ref 3.7–5.3)
POTASSIUM SERPL-SCNC: 5.2 MMOL/L (ref 3.7–5.3)
RBC # BLD AUTO: 3.27 M/UL (ref 4.21–5.77)
RBC # BLD AUTO: 3.46 M/UL (ref 4.21–5.77)
SODIUM SERPL-SCNC: 134 MMOL/L (ref 136–145)
SODIUM SERPL-SCNC: 135 MMOL/L (ref 136–145)
WBC OTHER # BLD: 8 K/UL (ref 3.5–11.3)
WBC OTHER # BLD: 9.6 K/UL (ref 3.5–11.3)

## 2024-08-20 PROCEDURE — 80048 BASIC METABOLIC PNL TOTAL CA: CPT

## 2024-08-20 PROCEDURE — 6370000000 HC RX 637 (ALT 250 FOR IP)

## 2024-08-20 PROCEDURE — 2580000003 HC RX 258

## 2024-08-20 PROCEDURE — 6370000000 HC RX 637 (ALT 250 FOR IP): Performed by: INTERNAL MEDICINE

## 2024-08-20 PROCEDURE — 51701 INSERT BLADDER CATHETER: CPT

## 2024-08-20 PROCEDURE — 51798 US URINE CAPACITY MEASURE: CPT

## 2024-08-20 PROCEDURE — 80051 ELECTROLYTE PANEL: CPT

## 2024-08-20 PROCEDURE — 2060000000 HC ICU INTERMEDIATE R&B

## 2024-08-20 PROCEDURE — 82947 ASSAY GLUCOSE BLOOD QUANT: CPT

## 2024-08-20 PROCEDURE — 36415 COLL VENOUS BLD VENIPUNCTURE: CPT

## 2024-08-20 PROCEDURE — 99222 1ST HOSP IP/OBS MODERATE 55: CPT | Performed by: INTERNAL MEDICINE

## 2024-08-20 PROCEDURE — 6360000002 HC RX W HCPCS

## 2024-08-20 PROCEDURE — 76770 US EXAM ABDO BACK WALL COMP: CPT

## 2024-08-20 PROCEDURE — 85027 COMPLETE CBC AUTOMATED: CPT

## 2024-08-20 RX ORDER — SODIUM BICARBONATE 650 MG/1
650 TABLET ORAL DAILY
Status: DISCONTINUED | OUTPATIENT
Start: 2024-08-20 | End: 2024-08-27 | Stop reason: HOSPADM

## 2024-08-20 RX ADMIN — DOCUSATE SODIUM 100 MG: 100 CAPSULE, LIQUID FILLED ORAL at 07:57

## 2024-08-20 RX ADMIN — ONDANSETRON 4 MG: 2 INJECTION INTRAMUSCULAR; INTRAVENOUS at 18:54

## 2024-08-20 RX ADMIN — SODIUM BICARBONATE 650 MG: 650 TABLET ORAL at 14:49

## 2024-08-20 RX ADMIN — OXYCODONE HYDROCHLORIDE 10 MG: 5 TABLET ORAL at 10:53

## 2024-08-20 RX ADMIN — METHOCARBAMOL 500 MG: 500 TABLET ORAL at 16:06

## 2024-08-20 RX ADMIN — METOPROLOL TARTRATE 12.5 MG: 25 TABLET, FILM COATED ORAL at 07:56

## 2024-08-20 RX ADMIN — SERTRALINE HYDROCHLORIDE 100 MG: 50 TABLET ORAL at 07:56

## 2024-08-20 RX ADMIN — GABAPENTIN 600 MG: 600 TABLET, FILM COATED ORAL at 21:58

## 2024-08-20 RX ADMIN — SODIUM CHLORIDE, PRESERVATIVE FREE 10 ML: 5 INJECTION INTRAVENOUS at 22:00

## 2024-08-20 RX ADMIN — GABAPENTIN 600 MG: 600 TABLET, FILM COATED ORAL at 07:56

## 2024-08-20 RX ADMIN — METHOCARBAMOL 500 MG: 500 TABLET ORAL at 13:28

## 2024-08-20 RX ADMIN — SODIUM CHLORIDE, PRESERVATIVE FREE 10 ML: 5 INJECTION INTRAVENOUS at 07:57

## 2024-08-20 RX ADMIN — HYOSCYAMINE SULFATE 125 MCG: 0.12 TABLET SUBLINGUAL at 21:58

## 2024-08-20 RX ADMIN — OXYCODONE HYDROCHLORIDE 10 MG: 5 TABLET ORAL at 05:28

## 2024-08-20 RX ADMIN — INSULIN GLARGINE 20 UNITS: 100 INJECTION, SOLUTION SUBCUTANEOUS at 08:06

## 2024-08-20 RX ADMIN — SODIUM CHLORIDE: 9 INJECTION, SOLUTION INTRAVENOUS at 06:43

## 2024-08-20 RX ADMIN — METHOCARBAMOL 500 MG: 500 TABLET ORAL at 07:56

## 2024-08-20 RX ADMIN — INSULIN GLARGINE 20 UNITS: 100 INJECTION, SOLUTION SUBCUTANEOUS at 21:54

## 2024-08-20 RX ADMIN — METHOCARBAMOL 500 MG: 500 TABLET ORAL at 21:58

## 2024-08-20 RX ADMIN — DOCUSATE SODIUM 100 MG: 100 CAPSULE, LIQUID FILLED ORAL at 21:59

## 2024-08-20 RX ADMIN — MORPHINE SULFATE 2 MG: 2 INJECTION, SOLUTION INTRAMUSCULAR; INTRAVENOUS at 14:33

## 2024-08-20 ASSESSMENT — PAIN SCALES - GENERAL
PAINLEVEL_OUTOF10: 6
PAINLEVEL_OUTOF10: 6
PAINLEVEL_OUTOF10: 7
PAINLEVEL_OUTOF10: 3
PAINLEVEL_OUTOF10: 7
PAINLEVEL_OUTOF10: 7

## 2024-08-20 ASSESSMENT — PAIN DESCRIPTION - DESCRIPTORS
DESCRIPTORS: ACHING
DESCRIPTORS: ACHING;SHARP

## 2024-08-20 ASSESSMENT — PAIN DESCRIPTION - LOCATION
LOCATION: ABDOMEN

## 2024-08-20 ASSESSMENT — PAIN DESCRIPTION - ORIENTATION
ORIENTATION: LEFT;LOWER
ORIENTATION: LEFT
ORIENTATION: LOWER
ORIENTATION: LEFT;LOWER

## 2024-08-20 NOTE — PROGRESS NOTES
[facility]ers Santacroce, Khan, Mostafa, Buck, Murtagh Jr  Urology Progress Note     Chief Complaint: Status post left radical nephrectomy    Subjective:  No acute events overnight, afebrile, vital signs stable  Pain level: Moderate  Denies fever, chills, nausea, vomiting, chest pain, shortness of breath  Has not ambulated  Not passing flatus  Pittman catheter in place draining clear yellow urine    UOP: 1 L since surgery  Labs pending    Patient Vitals for the past 24 hrs:   BP Temp Temp src Pulse Resp SpO2 Height Weight   08/20/24 0759 113/66 -- -- 68 18 96 % -- --   08/20/24 0523 (!) 145/77 98.4 °F (36.9 °C) Oral 69 16 96 % -- --   08/19/24 2318 -- -- -- -- -- -- 1.88 m (6' 2.02\") 89.8 kg (198 lb)   08/19/24 2252 (!) 164/85 98.8 °F (37.1 °C) Oral 80 16 99 % -- --   08/19/24 2203 -- -- -- 82 -- -- -- --   08/19/24 1900 (!) 159/87 97.9 °F (36.6 °C) -- 82 16 97 % -- --   08/19/24 1800 (!) 150/85 -- -- 81 13 97 % -- --   08/19/24 1700 139/75 98.6 °F (37 °C) -- 81 15 96 % -- --   08/19/24 1645 136/72 -- -- 81 14 99 % -- --   08/19/24 1630 134/77 97.5 °F (36.4 °C) -- 80 18 -- -- --   08/19/24 1615 129/68 -- -- 79 19 -- -- --   08/19/24 1601 138/68 97.4 °F (36.3 °C) -- 79 20 100 % -- --   08/19/24 1100 128/87 96.8 °F (36 °C) Temporal 77 18 95 % -- --       Intake/Output Summary (Last 24 hours) at 8/20/2024 1011  Last data filed at 8/20/2024 0659  Gross per 24 hour   Intake 2200 ml   Output 1315 ml   Net 885 ml       Recent Labs     08/19/24  1642 08/20/24  0909   WBC 13.5* 8.0   HGB 12.2* 10.2*   HCT 41.6 31.7*   .5* 96.9    195     Recent Labs     08/19/24  1642 08/20/24  0909   * 134*   K 5.3 5.2    106   CO2 13* 19*   BUN 34* 36*   CREATININE 2.6* 2.6*       No results for input(s): \"COLORU\", \"PHUR\", \"LABCAST\", \"WBCUA\", \"RBCUA\", \"MUCUS\", \"TRICHOMONAS\", \"YEAST\", \"BACTERIA\", \"CLARITYU\", \"SPECGRAV\", \"LEUKOCYTESUR\", \"UROBILINOGEN\", \"BILIRUBINUR\", \"BLOODU\" in the last 72 hours.    Invalid

## 2024-08-20 NOTE — PLAN OF CARE
Writer called spouse Lisa this am after receiving several calls for patients belongings from unit.  Pre op checklist states spouse took belongings home. His spouse does have his clothing and glasses and will bring them in around 9 am when they visit. Patel HAYES informed that family will bring clothes and glasses when they return.

## 2024-08-20 NOTE — DISCHARGE INSTRUCTIONS
General Discharge Instructions: Nephrectomy     Patient ok to discharge home in good condition  No heavy lifting, >10 lbs for 4-6 week, or until cleared by attending physician  Patient should avoid strenuous activity for 4-6 weeks  Patient should walk moderately at home 8-10x per day  Patient may resume diet as tolerated: Wean off of narcotic pain medication to plain tylenol, avoid NSAIDs.   Patient should take prescriptions as directed  No driving while on narcotics  Patient ok to shower in 24 hrs after discharge while keeping incision clean/dry  No submerging incisions for 2 weeks    Please call attending physician or hospital  with questions  Call or Present to ED if fever (> 101F), intractable nausea/vomiting or pain, if incisions become red/swollen or drain pus/fluid, or if calves become red swollen and tender.    Patient should follow up with Dr. Hargrove, in 1-2 weeks. Call office to confirm appointment.

## 2024-08-20 NOTE — CONSULTS
(NITROSTAT) 0.4 MG SL tablet, Place 1 tablet under the tongue every 5 minutes as needed  prasugrel (EFFIENT) 10 MG TABS, Take 1 tablet by mouth every morning Pt. stopped 8-15-24 for OR 24.  sertraline (ZOLOFT) 50 MG tablet, 2 tablets  Scheduled Meds:    ceFAZolin  2,000 mg IntraVENous Once    gabapentin  600 mg Oral BID    insulin glargine  20 Units SubCUTAneous BID    metoprolol tartrate  12.5 mg Oral BID    sertraline  100 mg Oral Daily    sodium chloride flush  5-40 mL IntraVENous 2 times per day    docusate sodium  100 mg Oral BID    methocarbamol  500 mg Oral 4x Daily    insulin lispro  0-8 Units SubCUTAneous TID WC    insulin lispro  0-4 Units SubCUTAneous Nightly     Continuous Infusions:    sodium chloride 75 mL/hr at 24 0643    sodium chloride       PRN Meds:  sodium chloride flush, sodium chloride, oxyCODONE **OR** oxyCODONE, morphine, ondansetron **OR** ondansetron, melatonin, hyoscyamine, calcium carbonate, artificial tears, hydrALAZINE, benzocaine-menthol, metoprolol, phenol    ALLERGY     Patient has no known allergies.       SOCIAL HISTORY     Social History     Socioeconomic History    Marital status:      Spouse name: Not on file    Number of children: Not on file    Years of education: Not on file    Highest education level: Not on file   Occupational History    Not on file   Tobacco Use    Smoking status: Former     Types: Pipe     Quit date: 1970     Years since quittin.6    Smokeless tobacco: Not on file   Vaping Use    Vaping status: Never Used   Substance and Sexual Activity    Alcohol use: Yes     Comment: rarely    Drug use: Never    Sexual activity: Not on file   Other Topics Concern    Not on file   Social History Narrative    Not on file     Social Determinants of Health     Financial Resource Strain: Not on file   Food Insecurity: Not on file   Transportation Needs: Not on file   Physical Activity: Not on file   Stress: Not on file   Social Connections: Not on file    Intimate Partner Violence: Not on file   Housing Stability: Not on file       FAMILY HISTORY     Family History   Problem Relation Age of Onset    No Known Problems Mother     Diabetes Father           REVIEW OF SYSTEM     Constitutional: No asthenia/weight loss/anorexia    HEENT : No epistaxis/visual blurriness/rhinorrhoea/sorethroat/trauma  Cardiovascular:No chest pain/palpitation/SOB  Respiratory: No cough/fever/SOB/Wheezing    Gastrointestinal: No abdominal pain/nausea/vomiting/diarrhoea/constipation  Genitourinary: No dysuria/pyuria/hematuria/incomplete emptying of bladder  Musculoskeletal:  No gait disturbance/weakness or joint complaints  Integumentary: No rash or pruritis.  Neurological: No headache/diplopia/change in muscle strength/numbness or tingling. No change in gait, balance, coordination, mood, affect, memory, mentation, behavior.  Psychiatric: No anxiety/depression.  Endocrine: No temperature intolerance. No excessive thirst, fluid intake, or urination. No tremor.  Hematologic/Lymphatic: No abnormal bruising or bleeding, blood clots or swolle lymph nodes.  Allergic/Immunologic: No nasal congestion or hives      PHYSICAL EXAM     Vitals:    08/19/24 2318 08/20/24 0523 08/20/24 0759 08/20/24 1155   BP:  (!) 145/77 113/66 105/64   Pulse:  69 68 60   Resp:  16 18 18   Temp:  98.4 °F (36.9 °C)  98 °F (36.7 °C)   TempSrc:  Oral  Oral   SpO2:  96% 96% 96%   Weight: 89.8 kg (198 lb)      Height: 1.88 m (6' 2.02\")        24HR INTAKE/OUTPUT:    Intake/Output Summary (Last 24 hours) at 8/20/2024 1406  Last data filed at 8/20/2024 0659  Gross per 24 hour   Intake 600 ml   Output 1040 ml   Net -440 ml       General appearance:Awake, alert, in no acute distress  Skin: warm and dry, no rash or erythema  Eyes: conjunctivae normal and sclera anicteric  ENT: no thrush no pharyngeal congestion  orodental hygiene   Neck: No JVD, Lymphadenopathty or thyromegaly  Respiratory: vesicular breath sounds,no

## 2024-08-21 LAB
ANION GAP SERPL CALCULATED.3IONS-SCNC: 10 MMOL/L (ref 9–16)
BUN SERPL-MCNC: 34 MG/DL (ref 8–23)
CALCIUM SERPL-MCNC: 8.1 MG/DL (ref 8.6–10.4)
CHLORIDE SERPL-SCNC: 106 MMOL/L (ref 98–107)
CO2 SERPL-SCNC: 18 MMOL/L (ref 20–31)
CREAT SERPL-MCNC: 2.6 MG/DL (ref 0.7–1.2)
ERYTHROCYTE [DISTWIDTH] IN BLOOD BY AUTOMATED COUNT: 16.2 % (ref 11.8–14.4)
GFR, ESTIMATED: 25 ML/MIN/1.73M2
GLUCOSE BLD-MCNC: 167 MG/DL (ref 75–110)
GLUCOSE BLD-MCNC: 179 MG/DL (ref 75–110)
GLUCOSE BLD-MCNC: 211 MG/DL (ref 75–110)
GLUCOSE SERPL-MCNC: 208 MG/DL (ref 74–99)
HCT VFR BLD AUTO: 30.1 % (ref 40.7–50.3)
HGB BLD-MCNC: 10.1 G/DL (ref 13–17)
MCH RBC QN AUTO: 31.6 PG (ref 25.2–33.5)
MCHC RBC AUTO-ENTMCNC: 33.6 G/DL (ref 28.4–34.8)
MCV RBC AUTO: 94.1 FL (ref 82.6–102.9)
NRBC BLD-RTO: 0 PER 100 WBC
PLATELET # BLD AUTO: 159 K/UL (ref 138–453)
PMV BLD AUTO: 9.9 FL (ref 8.1–13.5)
POTASSIUM SERPL-SCNC: 4.4 MMOL/L (ref 3.7–5.3)
RBC # BLD AUTO: 3.2 M/UL (ref 4.21–5.77)
SODIUM SERPL-SCNC: 134 MMOL/L (ref 136–145)
WBC OTHER # BLD: 6.7 K/UL (ref 3.5–11.3)

## 2024-08-21 PROCEDURE — 99232 SBSQ HOSP IP/OBS MODERATE 35: CPT | Performed by: INTERNAL MEDICINE

## 2024-08-21 PROCEDURE — 85027 COMPLETE CBC AUTOMATED: CPT

## 2024-08-21 PROCEDURE — 97116 GAIT TRAINING THERAPY: CPT

## 2024-08-21 PROCEDURE — 51798 US URINE CAPACITY MEASURE: CPT

## 2024-08-21 PROCEDURE — 6370000000 HC RX 637 (ALT 250 FOR IP)

## 2024-08-21 PROCEDURE — 97162 PT EVAL MOD COMPLEX 30 MIN: CPT

## 2024-08-21 PROCEDURE — 82947 ASSAY GLUCOSE BLOOD QUANT: CPT

## 2024-08-21 PROCEDURE — 2060000000 HC ICU INTERMEDIATE R&B

## 2024-08-21 PROCEDURE — 97530 THERAPEUTIC ACTIVITIES: CPT

## 2024-08-21 PROCEDURE — 36415 COLL VENOUS BLD VENIPUNCTURE: CPT

## 2024-08-21 PROCEDURE — 6370000000 HC RX 637 (ALT 250 FOR IP): Performed by: INTERNAL MEDICINE

## 2024-08-21 PROCEDURE — 80048 BASIC METABOLIC PNL TOTAL CA: CPT

## 2024-08-21 PROCEDURE — 6360000002 HC RX W HCPCS: Performed by: STUDENT IN AN ORGANIZED HEALTH CARE EDUCATION/TRAINING PROGRAM

## 2024-08-21 PROCEDURE — 2580000003 HC RX 258

## 2024-08-21 PROCEDURE — 6360000002 HC RX W HCPCS

## 2024-08-21 RX ORDER — TAMSULOSIN HYDROCHLORIDE 0.4 MG/1
0.4 CAPSULE ORAL DAILY
Status: DISCONTINUED | OUTPATIENT
Start: 2024-08-21 | End: 2024-08-27 | Stop reason: HOSPADM

## 2024-08-21 RX ORDER — HEPARIN SODIUM 5000 [USP'U]/ML
5000 INJECTION, SOLUTION INTRAVENOUS; SUBCUTANEOUS EVERY 8 HOURS SCHEDULED
Status: DISCONTINUED | OUTPATIENT
Start: 2024-08-21 | End: 2024-08-27 | Stop reason: HOSPADM

## 2024-08-21 RX ADMIN — SODIUM CHLORIDE, PRESERVATIVE FREE 10 ML: 5 INJECTION INTRAVENOUS at 10:07

## 2024-08-21 RX ADMIN — METHOCARBAMOL 500 MG: 500 TABLET ORAL at 16:53

## 2024-08-21 RX ADMIN — INSULIN GLARGINE 20 UNITS: 100 INJECTION, SOLUTION SUBCUTANEOUS at 10:03

## 2024-08-21 RX ADMIN — SODIUM CHLORIDE: 9 INJECTION, SOLUTION INTRAVENOUS at 08:29

## 2024-08-21 RX ADMIN — MORPHINE SULFATE 2 MG: 2 INJECTION, SOLUTION INTRAMUSCULAR; INTRAVENOUS at 03:49

## 2024-08-21 RX ADMIN — TAMSULOSIN HYDROCHLORIDE 0.4 MG: 0.4 CAPSULE ORAL at 10:02

## 2024-08-21 RX ADMIN — SODIUM CHLORIDE, PRESERVATIVE FREE 10 ML: 5 INJECTION INTRAVENOUS at 22:00

## 2024-08-21 RX ADMIN — SERTRALINE HYDROCHLORIDE 100 MG: 50 TABLET ORAL at 09:58

## 2024-08-21 RX ADMIN — GABAPENTIN 600 MG: 600 TABLET, FILM COATED ORAL at 22:12

## 2024-08-21 RX ADMIN — HEPARIN SODIUM 5000 UNITS: 5000 INJECTION INTRAVENOUS; SUBCUTANEOUS at 10:03

## 2024-08-21 RX ADMIN — METOPROLOL TARTRATE 12.5 MG: 25 TABLET, FILM COATED ORAL at 22:12

## 2024-08-21 RX ADMIN — DOCUSATE SODIUM 100 MG: 100 CAPSULE, LIQUID FILLED ORAL at 22:13

## 2024-08-21 RX ADMIN — INSULIN GLARGINE 20 UNITS: 100 INJECTION, SOLUTION SUBCUTANEOUS at 22:13

## 2024-08-21 RX ADMIN — METHOCARBAMOL 500 MG: 500 TABLET ORAL at 13:36

## 2024-08-21 RX ADMIN — METHOCARBAMOL 500 MG: 500 TABLET ORAL at 22:29

## 2024-08-21 RX ADMIN — HEPARIN SODIUM 5000 UNITS: 5000 INJECTION INTRAVENOUS; SUBCUTANEOUS at 13:36

## 2024-08-21 RX ADMIN — METHOCARBAMOL 500 MG: 500 TABLET ORAL at 09:58

## 2024-08-21 RX ADMIN — SODIUM BICARBONATE 650 MG: 650 TABLET ORAL at 09:58

## 2024-08-21 RX ADMIN — HEPARIN SODIUM 5000 UNITS: 5000 INJECTION INTRAVENOUS; SUBCUTANEOUS at 22:00

## 2024-08-21 RX ADMIN — INSULIN LISPRO 2 UNITS: 100 INJECTION, SOLUTION INTRAVENOUS; SUBCUTANEOUS at 10:03

## 2024-08-21 RX ADMIN — GABAPENTIN 600 MG: 600 TABLET, FILM COATED ORAL at 09:58

## 2024-08-21 RX ADMIN — DOCUSATE SODIUM 100 MG: 100 CAPSULE, LIQUID FILLED ORAL at 09:58

## 2024-08-21 ASSESSMENT — PAIN DESCRIPTION - LOCATION
LOCATION: ABDOMEN
LOCATION: ABDOMEN;GROIN
LOCATION: ABDOMEN

## 2024-08-21 ASSESSMENT — PAIN DESCRIPTION - DESCRIPTORS: DESCRIPTORS: PRESSURE

## 2024-08-21 ASSESSMENT — PAIN SCALES - GENERAL
PAINLEVEL_OUTOF10: 5
PAINLEVEL_OUTOF10: 5
PAINLEVEL_OUTOF10: 7

## 2024-08-21 ASSESSMENT — PAIN DESCRIPTION - ORIENTATION: ORIENTATION: LEFT;LOWER

## 2024-08-21 NOTE — PROGRESS NOTES
Physical Therapy  Facility/Department: [address] ONC/MED SURG  Physical Therapy Initial Assessment    Name: Randy Caputo  : 1945  MRN: [id number]  Date of Service: 2024    Brief history per EMR:      Randy Caputo is a 79 y.o. male who presents for PAT appointment. Patient complains of hematuria, ongoing for about two weeks, as well as left abdominal discomfort. He denies any urinary frequency, urgency, or flank pain. He state on imaging study, he was found to have kidney stones.     S/P XI ROBOTIC LAPAROSCOPIC RADICAL NEPHRECTOMY (Kidney)      CYSTO Dx Left renal mass.     Discharge Recommendations:  Patient would benefit from continued therapy after discharge          Patient Diagnosis(es): The encounter diagnosis was Renal mass.  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  Past Surgical History:  has a past surgical history that includes Cardiac catheterization; Colonoscopy; Appendectomy; Tonsillectomy; Cystoscopy; Foot surgery (Right, 2023); total nephrectomy (Left, 2024); Kidney surgery (N/A, 2024); and Cystoscopy (Left, 2024).    Assessment   Body Structures, Functions, Activity Limitations Requiring Skilled Therapeutic Intervention: Decreased functional mobility ;Decreased tolerance to work activity;Decreased strength;Decreased endurance;Decreased balance;Increased  Ambulation?: No  Stairs/Curb  Stairs?: No     Balance  Posture: Good  Sitting - Static: Good  Sitting - Dynamic: Good  Standing - Static: Good  Standing - Dynamic: Good  Comments: balance assessed with RW, and sitting EOB, Pt educated on erecting posture in standing as pt tends to forward flex posture  Functional Reach Test  Fall Risk (Score of <10 inches is fall risk): Yes             AM-PAC Basic Mobility - Inpatient   How much help is needed turning from your back to your side while in a flat bed without using bedrails?: A Lot  How much help is needed moving from lying on your back to sitting on the side of a flat bed without using bedrails?: A Lot  How much help is needed moving to and from a bed to a chair?: A Lot  How much help is needed standing up from a chair using your arms?: A Lot  How much help is needed walking in hospital room?: A Lot  How much help is needed climbing 3-5 steps with a railing?: A Lot  AM-PAC Inpatient Mobility Raw Score : 12  AM-PAC Inpatient T-Scale Score : 35.33  Mobility Inpatient CMS 0-100% Score: 68.66  Mobility Inpatient CMS G-Code Modifier : CL              Goals  Short Term Goals  Time Frame for Short Term Goals: 14 visits  Short Term Goal 1: Pt to complete bed mobility with mod I and sit EOB unsupported.  Short Term Goal 2: Pt to ambulate 100 ft mod I with least AD  Short Term Goal 3: Pt to transfer from alternate surface heights with Mod I demonstrating safety with AD  Short Term Goal 4: Pt to ascend/descend 4 steps with Mod I and rigth railing  Short Term Goal 5: Pt to be provided verbal, written and practical instruction in bilateral LE ROM exercises to improve general strength and activity tolerance.  Patient Goals   Patient Goals : To return home       Education  Patient Education  Education Given To: Patient  Education Provided: Role of Therapy;Plan of Care;Precautions;Transfer Training;Equipment;Fall Prevention Strategies  Education Provided Comments: Pt educated on

## 2024-08-21 NOTE — PLAN OF CARE
Problem: Discharge Planning  Goal: Discharge to home or other facility with appropriate resources  Outcome: Progressing     Problem: Chronic Conditions and Co-morbidities  Goal: Patient's chronic conditions and co-morbidity symptoms are monitored and maintained or improved  Outcome: Progressing     Problem: Pain  Goal: Verbalizes/displays adequate comfort level or baseline comfort level  Outcome: Progressing     Problem: Discharge Planning  Goal: Discharge to home or other facility with appropriate resources  8/21/2024 1431 by Harley Aiken RN  Outcome: Progressing  8/21/2024 1431 by Harley Aiken RN  Outcome: Progressing     Problem: Chronic Conditions and Co-morbidities  Goal: Patient's chronic conditions and co-morbidity symptoms are monitored and maintained or improved  8/21/2024 1431 by Harley Aiken RN  Outcome: Progressing  8/21/2024 1431 by Harley Aiken RN  Outcome: Progressing     Problem: Pain  Goal: Verbalizes/displays adequate comfort level or baseline comfort level  8/21/2024 1431 by Harley Aiken RN  Outcome: Progressing  8/21/2024 1431 by Harley Aiken RN  Outcome: Progressing     Problem: Safety - Adult  Goal: Free from fall injury  8/21/2024 1431 by Harley Aiken RN  Outcome: Progressing  8/21/2024 1431 by Harley Aiken RN  Outcome: Progressing     Problem: ABCDS Injury Assessment  Goal: Absence of physical injury  8/21/2024 1431 by Harley Aiken RN  Outcome: Progressing  8/21/2024 1431 by Harley Aiken RN  Outcome: Progressing     Problem: Skin/Tissue Integrity - Adult  Goal: Skin integrity remains intact  8/21/2024 1431 by Harley Aiken RN  Outcome: Progressing  8/21/2024 1431 by Harley Aiken RN  Outcome: Progressing  Goal: Incisions, wounds, or drain sites healing without S/S of infection  8/21/2024 1431 by Harley Aiken RN  Outcome: Progressing  8/21/2024 1431 by Harley Aiken RN  Outcome: Progressing  Goal: Oral mucous membranes remain intact  8/21/2024 1431 by  [place], Reegan, RN  Outcome: Progressing  8/21/2024 1431 by Harley Aiken RN  Outcome: Progressing     Problem: Musculoskeletal - Adult  Goal: Return mobility to safest level of function  8/21/2024 1431 by Harley Aiken RN  Outcome: Progressing  8/21/2024 1431 by Harley Aiken RN  Outcome: Progressing  Goal: Maintain proper alignment of affected body part  8/21/2024 1431 by Harley Aiken RN  Outcome: Progressing  8/21/2024 1431 by Harley Aiken RN  Outcome: Progressing  Goal: Return ADL status to a safe level of function  8/21/2024 1431 by Harley Aiken RN  Outcome: Progressing  8/21/2024 1431 by Harley Aiken RN  Outcome: Progressing     Problem: Gastrointestinal - Adult  Goal: Minimal or absence of nausea and vomiting  8/21/2024 1431 by Harley Aiken RN  Outcome: Progressing  8/21/2024 1431 by Harley Aiken RN  Outcome: Progressing  Goal: Maintains or returns to baseline bowel function  8/21/2024 1431 by Harley Aiken RN  Outcome: Progressing  8/21/2024 1431 by Harley Aiken RN  Outcome: Progressing  Goal: Maintains adequate nutritional intake  8/21/2024 1431 by Harley Aiken RN  Outcome: Progressing  8/21/2024 1431 by Harley Aiken RN  Outcome: Progressing  Goal: Establish and maintain optimal ostomy function  8/21/2024 1431 by Harley Aiken RN  Outcome: Progressing  8/21/2024 1431 by Harley Aiken RN  Outcome: Progressing     Problem: Genitourinary - Adult  Goal: Absence of urinary retention  8/21/2024 1431 by Harley Aiken RN  Outcome: Progressing  8/21/2024 1431 by Harley Aiken RN  Outcome: Progressing  Goal: Urinary catheter remains patent  8/21/2024 1431 by Harley Aiken RN  Outcome: Progressing  8/21/2024 1431 by Harley Aiken RN  Outcome: Progressing

## 2024-08-21 NOTE — PROGRESS NOTES
[facility], Khan, Mostafa, Buck, Murtagh Jr  Urology Progress Note     Chief Complaint: Status post left radical nephrectomy    Subjective:  Patient was in urinary retention overnight for 640 cc.  Pittman catheter was replaced.  Patient has not ambulated since surgery  Patient has tolerated regular diet  Has not passed flatus or had bowel movements      Patient Vitals for the past 24 hrs:   BP Temp Temp src Pulse Resp SpO2   08/21/24 0330 114/69 -- -- 74 -- --   08/20/24 2158 96/70 -- -- 68 -- --   08/20/24 1946 96/70 98.1 °F (36.7 °C) Oral 63 18 96 %   08/20/24 1555 92/67 97.9 °F (36.6 °C) Oral 62 16 94 %   08/20/24 1155 105/64 98 °F (36.7 °C) Oral 60 18 96 %   08/20/24 0759 113/66 -- -- 68 18 96 %       Intake/Output Summary (Last 24 hours) at 8/21/2024 0704  Last data filed at 8/20/2024 1450  Gross per 24 hour   Intake --   Output 500 ml   Net -500 ml       Recent Labs     08/19/24  1642 08/20/24  0909 08/20/24  1329   WBC 13.5* 8.0 9.6   HGB 12.2* 10.2* 10.8*   HCT 41.6 31.7* 33.4*   .5* 96.9 96.5    195 200     Recent Labs     08/19/24  1642 08/20/24  0909 08/20/24  1329   * 134* 135*   K 5.3 5.2 4.7    106 106   CO2 13* 19* 17*   BUN 34* 36*  --    CREATININE 2.6* 2.6*  --        No results for input(s): \"COLORU\", \"PHUR\", \"LABCAST\", \"WBCUA\", \"RBCUA\", \"MUCUS\", \"TRICHOMONAS\", \"YEAST\", \"BACTERIA\", \"CLARITYU\", \"SPECGRAV\", \"LEUKOCYTESUR\", \"UROBILINOGEN\", \"BILIRUBINUR\", \"BLOODU\" in the last 72 hours.    Invalid input(s): \"NITRATE\", \"GLUCOSEUKETONESUAMORPHOUS\"    Additional Lab/culture results:  None    Physical Exam:  Constitutional: Patient in no acute distress.  Neuro: alert and oriented to person place and time.  HEENT: No acute abnormalities  Lungs: Respiratory effort normal on room air  Cardiovascular:  Heart rate regular rate and rhythm  Abdomen: Soft, appropriately tender, mild distention and tympanic. Non peritonitic.  Incisions clean dry and intact  Extremities: No

## 2024-08-21 NOTE — PROGRESS NOTES
2030: bladder scan 243mL    0230: bladder scan 611mL     Pt encouraged and assisted in attempting to void. Pt unable to void any amount. Pt states he \"really has to go but can't\" and that it \"really hurts.\"     0300: milian was placed per Dr. Savage (see orders)

## 2024-08-21 NOTE — CARE COORDINATION
Case Management Assessment  Initial Evaluation    Date/Time of Evaluation: 8/21/2024 5:06 PM  Assessment Completed by: JOSEMANUEL NGUYEN    If patient is discharged prior to next notation, then this note serves as note for discharge by case management.    Patient Name: Randy Caputo                   YOB: 1945  Diagnosis: Renal mass [N28.89]  Left kidney mass [N28.89]                   Date / Time: 8/19/2024 10:06 AM    Patient Admission Status: Inpatient   Readmission Risk (Low < 19, Mod (19-27), High > 27): Readmission Risk Score: 14.2    Current PCP: Humberto Craig MD  PCP verified by CM? Yes    Chart Reviewed: Yes      History Provided by: Patient  Patient Orientation: Alert and Oriented, Person, Place, Situation, Self    Patient Cognition: Alert    Hospitalization in the last 30 days (Readmission):  No    If yes, Readmission Assessment in  Navigator will be completed.    Advance Directives:      Code Status: Full Code   Patient's Primary Decision Maker is: Legal Next of Kin      Discharge Planning:    Patient lives with: (P) Spouse/Significant Other Type of Home: (P) House  Primary Care Giver: Self  Patient Support Systems include: Spouse/Significant Other   Current Financial resources: Medicare  Current community resources:    Current services prior to admission: (P) Durable Medical Equipment            Current DME: (P) [facility]            Type of Home Care services:  (P) None    ADLS  Prior functional level: Independent in ADLs/IADLs  Current functional level: Independent in ADLs/IADLs    PT AM-PAC: 12 /24  OT AM-PAC:   /24    Family can provide assistance at DC: Yes  Would you like Case Management to discuss the discharge plan with any other family members/significant others, and if so, who? No  Plans to Return to Present Housing: Yes  Other Identified Issues/Barriers to RETURNING to current housing: No  Potential Assistance needed at discharge: N/A            Potential

## 2024-08-21 NOTE — PROGRESS NOTES
Renal Progress Note    Patient :  Randy Caputo; 79 y.o. MRN# [id number]  Location:  0447/0447-01  Attending:  Chester Hargrove MD  Admit Date:  8/19/2024   Hospital Day: 2    Subjective:     Afebrile  Hemodynamically stable with blood pressure 109/90   On room air maintaining saturation 90%    Labs reviewed.  Sodium 134, 106, , creatinine 2 hemoglobin 10.1  Renal ultrasound unremarkable no hydronephrosis s/p left nephrectomy  Urine output 1.2 L    Renal function essentially unchanged 2.6.  Currently 0.9% NaCl at 75 mL per  As per urology removal of Pittman  with void trial, recommend ambulation, gentle spirometry.        Outpatient Medications:     Medications Prior to Admission: aspirin (ASPIRIN 81) 81 MG EC tablet, 1 tablet Pt. stopped 8-12-24 for OR 8-19-24.  gabapentin (NEURONTIN) 600 MG tablet, Take 1 tablet by mouth 2 times daily.  dulaglutide (TRULICITY) 0.75 MG/0.5ML SOPN SC injection, Inject 0.5 mLs into the skin once a week  ATORVASTATIN CALCIUM PO, Take 10 mg by mouth daily  vitamin D (D3) 25 MCG (1000 UT) CAPS, Take by mouth daily  metoprolol tartrate (LOPRESSOR) 25 MG tablet, Take 0.5 tablets by mouth 2 times daily Hold is systolic BP is less than 110  lisinopril (PRINIVIL;ZESTRIL) 5 MG tablet, Take 1 tablet by mouth daily  insulin glargine (LANTUS) 100 UNIT/ML injection vial, Inject 20 Units into the skin 2 times daily  insulin lispro, 1 Unit Dial, (HUMALOG/ADMELOG) 100 UNIT/ML SOPN, Inject into the skin 3 times daily (before meals) Sliding scale  nitroGLYCERIN (NITROSTAT) 0.4 MG SL tablet, Place 1 tablet under the tongue every 5 minutes as needed  prasugrel (EFFIENT) 10 MG TABS, Take 1 tablet by mouth every morning Pt. stopped 8-15-24 for OR 8-19-24.  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 sodium bicarbonate  BMP daily  Avoid nephrotoxins  Medical management as per primary  Follow urology recommendations  Nephrology will sign off, please contact us in case of any questions.  Nutrition   Please ensure that patient is on a renal diet/TF. Avoid nephrotoxic drugs/contrast exposure.    Marija Graham MD  Internal Medicine Resident, PGY-2  [place], [place]  8/21/2024, 8:33 AM      This note is created with the assistance of a speech-recognition program. While intending to generate a document that actually reflects the content of the visit, no guarantees can be provided that every mistake has been identified and corrected by editing.  Attending Physician Statement  I have discussed the care of Randy Caputo, including pertinent history and exam findings,  with the Fellow/Residentt. I have reviewed the key elements of all parts of the encounter with the Fellow/ Resident.  I agree with the assessment, plan and orders as documented by the resident.  CKD 4  Metaboli acidosis  Urinary retention  Continue sod bicarb  Jacob Savage MD MD, MRCP ([place]), FACP   8/21/2024 12:33 PM    [facility]

## 2024-08-22 LAB
ANION GAP SERPL CALCULATED.3IONS-SCNC: 10 MMOL/L (ref 9–16)
BUN SERPL-MCNC: 24 MG/DL (ref 8–23)
CALCIUM SERPL-MCNC: 8.4 MG/DL (ref 8.6–10.4)
CHLORIDE SERPL-SCNC: 107 MMOL/L (ref 98–107)
CO2 SERPL-SCNC: 19 MMOL/L (ref 20–31)
CREAT SERPL-MCNC: 2.4 MG/DL (ref 0.7–1.2)
ERYTHROCYTE [DISTWIDTH] IN BLOOD BY AUTOMATED COUNT: 15.5 % (ref 11.8–14.4)
GFR, ESTIMATED: 28 ML/MIN/1.73M2
GLUCOSE BLD-MCNC: 237 MG/DL (ref 75–110)
GLUCOSE BLD-MCNC: 262 MG/DL (ref 75–110)
GLUCOSE SERPL-MCNC: 173 MG/DL (ref 74–99)
HCT VFR BLD AUTO: 34.2 % (ref 40.7–50.3)
HGB BLD-MCNC: 11 G/DL (ref 13–17)
MCH RBC QN AUTO: 30.8 PG (ref 25.2–33.5)
MCHC RBC AUTO-ENTMCNC: 32.2 G/DL (ref 28.4–34.8)
MCV RBC AUTO: 95.8 FL (ref 82.6–102.9)
NRBC BLD-RTO: 0 PER 100 WBC
PLATELET # BLD AUTO: 193 K/UL (ref 138–453)
PMV BLD AUTO: 9.3 FL (ref 8.1–13.5)
POTASSIUM SERPL-SCNC: 4.2 MMOL/L (ref 3.7–5.3)
RBC # BLD AUTO: 3.57 M/UL (ref 4.21–5.77)
SODIUM SERPL-SCNC: 136 MMOL/L (ref 136–145)
WBC OTHER # BLD: 7.3 K/UL (ref 3.5–11.3)

## 2024-08-22 PROCEDURE — 36415 COLL VENOUS BLD VENIPUNCTURE: CPT

## 2024-08-22 PROCEDURE — 97535 SELF CARE MNGMENT TRAINING: CPT

## 2024-08-22 PROCEDURE — 82947 ASSAY GLUCOSE BLOOD QUANT: CPT

## 2024-08-22 PROCEDURE — 2060000000 HC ICU INTERMEDIATE R&B

## 2024-08-22 PROCEDURE — 99222 1ST HOSP IP/OBS MODERATE 55: CPT | Performed by: STUDENT IN AN ORGANIZED HEALTH CARE EDUCATION/TRAINING PROGRAM

## 2024-08-22 PROCEDURE — 6360000002 HC RX W HCPCS

## 2024-08-22 PROCEDURE — 97166 OT EVAL MOD COMPLEX 45 MIN: CPT

## 2024-08-22 PROCEDURE — 97530 THERAPEUTIC ACTIVITIES: CPT

## 2024-08-22 PROCEDURE — 2580000003 HC RX 258

## 2024-08-22 PROCEDURE — 85027 COMPLETE CBC AUTOMATED: CPT

## 2024-08-22 PROCEDURE — 6370000000 HC RX 637 (ALT 250 FOR IP)

## 2024-08-22 PROCEDURE — 6360000002 HC RX W HCPCS: Performed by: STUDENT IN AN ORGANIZED HEALTH CARE EDUCATION/TRAINING PROGRAM

## 2024-08-22 PROCEDURE — 80048 BASIC METABOLIC PNL TOTAL CA: CPT

## 2024-08-22 PROCEDURE — 6370000000 HC RX 637 (ALT 250 FOR IP): Performed by: INTERNAL MEDICINE

## 2024-08-22 RX ORDER — OXYCODONE HYDROCHLORIDE 5 MG/1
5 TABLET ORAL EVERY 6 HOURS PRN
Qty: 5 TABLET | Refills: 0 | Status: SHIPPED | OUTPATIENT
Start: 2024-08-22 | End: 2024-08-27

## 2024-08-22 RX ORDER — DOCUSATE SODIUM 100 MG/1
100 CAPSULE, LIQUID FILLED ORAL 2 TIMES DAILY
Qty: 60 CAPSULE | Refills: 0 | Status: SHIPPED | OUTPATIENT
Start: 2024-08-22 | End: 2024-09-21

## 2024-08-22 RX ADMIN — GABAPENTIN 600 MG: 600 TABLET, FILM COATED ORAL at 22:24

## 2024-08-22 RX ADMIN — GABAPENTIN 600 MG: 600 TABLET, FILM COATED ORAL at 09:25

## 2024-08-22 RX ADMIN — INSULIN GLARGINE 20 UNITS: 100 INJECTION, SOLUTION SUBCUTANEOUS at 22:25

## 2024-08-22 RX ADMIN — INSULIN GLARGINE 20 UNITS: 100 INJECTION, SOLUTION SUBCUTANEOUS at 09:25

## 2024-08-22 RX ADMIN — TAMSULOSIN HYDROCHLORIDE 0.4 MG: 0.4 CAPSULE ORAL at 09:25

## 2024-08-22 RX ADMIN — SERTRALINE HYDROCHLORIDE 100 MG: 50 TABLET ORAL at 09:25

## 2024-08-22 RX ADMIN — METOPROLOL TARTRATE 12.5 MG: 25 TABLET, FILM COATED ORAL at 09:25

## 2024-08-22 RX ADMIN — METHOCARBAMOL 500 MG: 500 TABLET ORAL at 09:25

## 2024-08-22 RX ADMIN — SODIUM CHLORIDE, PRESERVATIVE FREE 10 ML: 5 INJECTION INTRAVENOUS at 09:26

## 2024-08-22 RX ADMIN — SODIUM BICARBONATE 650 MG: 650 TABLET ORAL at 09:25

## 2024-08-22 RX ADMIN — INSULIN LISPRO 2 UNITS: 100 INJECTION, SOLUTION INTRAVENOUS; SUBCUTANEOUS at 18:24

## 2024-08-22 RX ADMIN — METHOCARBAMOL 500 MG: 500 TABLET ORAL at 22:24

## 2024-08-22 RX ADMIN — OXYCODONE HYDROCHLORIDE 10 MG: 5 TABLET ORAL at 22:40

## 2024-08-22 RX ADMIN — HEPARIN SODIUM 5000 UNITS: 5000 INJECTION INTRAVENOUS; SUBCUTANEOUS at 22:25

## 2024-08-22 RX ADMIN — ONDANSETRON 4 MG: 2 INJECTION INTRAMUSCULAR; INTRAVENOUS at 22:23

## 2024-08-22 RX ADMIN — METOPROLOL TARTRATE 12.5 MG: 25 TABLET, FILM COATED ORAL at 22:24

## 2024-08-22 RX ADMIN — OXYCODONE HYDROCHLORIDE 10 MG: 5 TABLET ORAL at 06:34

## 2024-08-22 RX ADMIN — SODIUM CHLORIDE, PRESERVATIVE FREE 10 ML: 5 INJECTION INTRAVENOUS at 22:25

## 2024-08-22 RX ADMIN — HEPARIN SODIUM 5000 UNITS: 5000 INJECTION INTRAVENOUS; SUBCUTANEOUS at 06:34

## 2024-08-22 RX ADMIN — METHOCARBAMOL 500 MG: 500 TABLET ORAL at 18:24

## 2024-08-22 RX ADMIN — HEPARIN SODIUM 5000 UNITS: 5000 INJECTION INTRAVENOUS; SUBCUTANEOUS at 14:56

## 2024-08-22 RX ADMIN — OXYCODONE HYDROCHLORIDE 5 MG: 5 TABLET ORAL at 01:23

## 2024-08-22 RX ADMIN — DOCUSATE SODIUM 100 MG: 100 CAPSULE, LIQUID FILLED ORAL at 09:25

## 2024-08-22 RX ADMIN — METHOCARBAMOL 500 MG: 500 TABLET ORAL at 14:56

## 2024-08-22 ASSESSMENT — PAIN SCALES - GENERAL
PAINLEVEL_OUTOF10: 8
PAINLEVEL_OUTOF10: 7
PAINLEVEL_OUTOF10: 5
PAINLEVEL_OUTOF10: 6
PAINLEVEL_OUTOF10: 2

## 2024-08-22 ASSESSMENT — PAIN DESCRIPTION - DESCRIPTORS: DESCRIPTORS: DISCOMFORT

## 2024-08-22 ASSESSMENT — PAIN DESCRIPTION - LOCATION
LOCATION: ABDOMEN

## 2024-08-22 NOTE — CONSULTS
mg, Oral, BID  insulin glargine (LANTUS) injection vial 20 Units, 20 Units, SubCUTAneous, BID  metoprolol tartrate (LOPRESSOR) tablet 12.5 mg, 12.5 mg, Oral, BID  sertraline (ZOLOFT) tablet 100 mg, 100 mg, Oral, Daily  0.9 % sodium chloride infusion, , IntraVENous, Continuous  sodium chloride flush 0.9 % injection 5-40 mL, 5-40 mL, IntraVENous, 2 times per day  sodium chloride flush 0.9 % injection 5-40 mL, 5-40 mL, IntraVENous, PRN  0.9 % sodium chloride infusion, , IntraVENous, PRN  oxyCODONE (ROXICODONE) immediate release tablet 5 mg, 5 mg, Oral, Q4H PRN **OR** oxyCODONE (ROXICODONE) immediate release tablet 10 mg, 10 mg, Oral, Q4H PRN  morphine (PF) injection 2 mg, 2 mg, IntraVENous, Q3H PRN  ondansetron (ZOFRAN-ODT) disintegrating tablet 4 mg, 4 mg, Oral, Q8H PRN **OR** ondansetron (ZOFRAN) injection 4 mg, 4 mg, IntraVENous, Q6H PRN  docusate sodium (COLACE) capsule 100 mg, 100 mg, Oral, BID  melatonin tablet 6 mg, 6 mg, Oral, Nightly PRN  hyoscyamine (LEVSIN/SL) sublingual tablet 125 mcg, 125 mcg, SubLINGual, Q4H PRN  methocarbamol (ROBAXIN) tablet 500 mg, 500 mg, Oral, 4x Daily  calcium carbonate (TUMS) chewable tablet 500 mg, 500 mg, Oral, TID PRN  lubrifresh P.M. (artificial tears) ophthalmic ointment, , Both Eyes, PRN  hydrALAZINE (APRESOLINE) injection 5 mg, 5 mg, IntraVENous, Q6H PRN  benzocaine-menthol (CEPACOL SORE THROAT) lozenge 1 lozenge, 1 lozenge, Oral, Q2H PRN  metoprolol (LOPRESSOR) injection 5 mg, 5 mg, IntraVENous, Q6H PRN  phenol 1.4 % mouth spray 1 spray, 1 spray, Mouth/Throat, Q2H PRN  insulin lispro (HUMALOG,ADMELOG) injection vial 0-8 Units, 0-8 Units, SubCUTAneous, TID WC  insulin lispro (HUMALOG,ADMELOG) injection vial 0-4 Units, 0-4 Units, SubCUTAneous, Nightly    Family History:       Problem Relation Age of Onset    No Known Problems Mother     Diabetes Father        Social History:  Lives With: Spouse  Type of Home: House  Home Layout: One level  Home Access: Stairs to enter with  BMI 25.41 kg/m²     GEN: Well developed, well nourished, no acute distress  HEENT: Normocephalic, atraumatic.  EOM grossly intact.  Hearing grossly intact.  Mucous membranes pink and moist.  RESP: Normal breath sounds with no wheezing, rales, or rhonchi. Respirations WNL and unlabored.  CV: Regular rate and rhythm. No murmurs, rubs, or gallops.  ABD: Soft, non-distended, bowel sounds present and equal.  NEURO: Alert.  Speech fluent.  Sensation to light touch decreased in the bilateral feet.  MSK: Muscle bulk is normal bilaterally. Strength 4+/5 in the bilateral upper limbs.  Strength 4+/5 with bilateral ankle dorsiflexion and plantarflexion.  LIMBS: No edema in bilateral lower limbs.  SKIN: Warm and dry with good turgor.  PSYCH: Mood WNL. Affect WNL. Appropriately interactive.    Diagnostics:    CBC:   Recent Labs     08/20/24  1329 08/21/24  0726 08/22/24  0657   WBC 9.6 6.7 7.3   RBC 3.46* 3.20* 3.57*   HGB 10.8* 10.1* 11.0*   HCT 33.4* 30.1* 34.2*   MCV 96.5 94.1 95.8   RDW 15.9* 16.2* 15.5*    159 193     BMP:   Recent Labs     08/20/24  0909 08/20/24  1329 08/21/24  0726 08/22/24  0657   * 135* 134* 136   K 5.2 4.7 4.4 4.2    106 106 107   CO2 19* 17* 18* 19*   BUN 36*  --  34* 24*   CREATININE 2.6*  --  2.6* 2.4*   GLUCOSE 181*  --  208* 173*      HbA1c: No results found for: \"LABA1C\"  BNP: No results for input(s): \"BNP\" in the last 72 hours.  PT/INR: No results for input(s): \"PROTIME\", \"INR\" in the last 72 hours.  APTT: No results for input(s): \"APTT\" in the last 72 hours.  CARDIAC ENZYMES: No results for input(s): \"CKMB\", \"CKMBINDEX\", \"TROPONINT\" in the last 72 hours.    Invalid input(s): \"CKTOTAL;3\"  FASTING LIPID PANEL:No results found for: \"CHOL\", \"HDL\", \"TRIG\"  LIVER PROFILE: No results for input(s): \"AST\", \"ALT\", \"BILIDIR\", \"BILITOT\", \"ALKPHOS\" in the last 72 hours.    Invalid input(s): \"ALB\"    Radiology:  US RENAL COMPLETE    Result Date: 8/20/2024  EXAMINATION: RETROPERITONEAL

## 2024-08-22 NOTE — PROGRESS NOTES
Occupational Therapy  Facility/Department: [address] ONC/MED SURG   Occupational Therapy Initial Evaluation    Patient Name: Randy Caputo        MRN: [id number]    : 1945    Date of Service: 2024      PER CHART \"Randy Caputo is a 79 y.o. male who presents for PAT appointment. Patient complains of hematuria, ongoing for about two weeks, as well as left abdominal discomfort. He denies any urinary frequency, urgency, or flank pain. He state on imaging study, he was found to have kidney stones. Patient has been scheduled for L nephrectomy. \"    Discharge Recommendations  Discharge Recommendations: Patient would benefit from continued therapy after discharge    OT Equipment Recommendations  Equipment Needed: Yes  Mobility Devices: ADL Assistive Devices  ADL Assistive Devices: Sock-Aid Hard, Reacher, Long-handled Shoe Horn, Long-handled Sponge    Assessment  Performance deficits / Impairments: Decreased functional mobility ;Decreased ADL status;Decreased endurance;Decreased balance;Decreased high-level IADLs;Decreased strength;Decreased ROM  Assessment: Pt agreeable to OT evaluation this AM. Pt completed bed mobiltiy with Mod A x 1, functional transfers with CGA and functional mobility with CGA. Use of RW. OT facilitated ADLs. Pt stood to complete grooming tasks w/ Mod IND for task and CGA for dynamic standing balance. Most significantly limited by abdominal pain throughout. Educated on breathing tech for pain management with fair return. Pt limited by the above noted deficits impacting pt's functional mobility/ADL performance. Pt is expected to require skilled OT services during their acute hospitilization to increase safety and promote increased independence throughout ADLs, IADLs and functional mobility tasks. Pt is currently unsafe to return to their prior living arrangements without 24/7 assist/supervision to safely engage in all aspects of ADLs, IADLs and functional mobility tasks.   Prognosis:  tray table elevated for ADLs. CGA. Able to demo uni/B hand release off device, but occasional UE support to correct minor unsteadiness. Limited by pain.)    Transfers/Mobility  Bed mobility  Supine to Sit: Moderate assistance (Assist for trunk progression, use of bedrail, increased time/effort)  Sit to Supine: Moderate assistance (Assist for BLE progression)  Scooting: Contact guard assistance  Bed Mobility Comments: HOB elevated ~30 degrees; Limited by abodominal pain at surgical site    Transfers  Sit to stand: Contact guard assistance  Stand to sit: Contact guard assistance  Transfer Comments: Increased time/effort. CGA d/t minor unsteadiness. Use of RW. On/off EOB 1x and On/off recliner 2x.         Functional Mobility: Contact guard assistance  Functional Mobility Skilled Clinical Factors: Pt completed functional mobility EOB to recliner w/ break. Then completed standing ADL tasks and requested to return EOB. Completed mobility with CGA, increased effort and time. Pt limited by pain and overall decreased endurance. Declined to attempt further mobility.       Patient Education  Patient Education  Education Given To: Patient  Education Provided: Role of Therapy;Plan of Care;ADL Adaptive Strategies;Transfer Training;Energy Conservation;Equipment;Fall Prevention Strategies  Education Provided Comments: activity promotion, safety awareness, walker mangement, breathing tech/pain management-good return  Education Method: Demonstration;Verbal  Barriers to Learning: None  Education Outcome: Demonstrated understanding;Continued education needed;Verbalized understanding    Goals  Short Term Goals  Time Frame for Short Term Goals: By discharge, pt will:  Short Term Goal 1: Demo functional sit<>stand transfers and functional mobility with SBA and use of LRAD PRN to promote increased independence throughout ADLs  Short Term Goal 2: Demo 10 minutes of dynamic standing tolerance with SBA and intermittent uni/bilateral hand

## 2024-08-22 NOTE — PLAN OF CARE
Problem: Discharge Planning  Goal: Discharge to home or other facility with appropriate resources  Outcome: Progressing     Problem: Chronic Conditions and Co-morbidities  Goal: Patient's chronic conditions and co-morbidity symptoms are monitored and maintained or improved  Outcome: Progressing     Problem: Pain  Goal: Verbalizes/displays adequate comfort level or baseline comfort level  Outcome: Progressing     Problem: Safety - Adult  Goal: Free from fall injury  Outcome: Progressing     Problem: ABCDS Injury Assessment  Goal: Absence of physical injury  Outcome: Progressing     Problem: Skin/Tissue Integrity - Adult  Goal: Skin integrity remains intact  Outcome: Progressing  Goal: Incisions, wounds, or drain sites healing without S/S of infection  Outcome: Progressing  Goal: Oral mucous membranes remain intact  Outcome: Progressing     Problem: Musculoskeletal - Adult  Goal: Return mobility to safest level of function  Outcome: Progressing  Goal: Maintain proper alignment of affected body part  Outcome: Progressing  Goal: Return ADL status to a safe level of function  Outcome: Progressing     Problem: Gastrointestinal - Adult  Goal: Minimal or absence of nausea and vomiting  Outcome: Progressing  Goal: Maintains or returns to baseline bowel function  Outcome: Progressing  Goal: Maintains adequate nutritional intake  Outcome: Progressing  Goal: Establish and maintain optimal ostomy function  Outcome: Progressing     Problem: Genitourinary - Adult  Goal: Absence of urinary retention  Outcome: Progressing  Goal: Urinary catheter remains patent  Outcome: Progressing

## 2024-08-22 NOTE — PROGRESS NOTES
[facility] Santacroce, Khan, Mostafa, Buck, Murtagh Jr  Urology Progress Note     Chief Complaint: Status post left radical nephrectomy    Subjective:  No acute events overnight, afebrile, vitals stable  Denies fever, chills, nausea, vomiting  Patient has tolerated regular diet  Did pass flatus, had a bowel movement  Ambulated once in the room    A.m. labs pending      Patient Vitals for the past 24 hrs:   BP Temp Temp src Pulse Resp SpO2   08/22/24 0821 117/66 98.4 °F (36.9 °C) Oral -- 18 --   08/22/24 0153 -- -- -- -- 14 --   08/22/24 0000 119/63 98.6 °F (37 °C) Oral 74 14 --   08/21/24 1936 124/67 99 °F (37.2 °C) Oral 81 14 93 %   08/21/24 1530 136/77 98 °F (36.7 °C) Oral 75 16 94 %   08/21/24 1208 124/67 -- -- 70 -- 93 %   08/21/24 1145 127/67 98.3 °F (36.8 °C) Axillary -- 16 --       Intake/Output Summary (Last 24 hours) at 8/22/2024 0903  Last data filed at 8/22/2024 0630  Gross per 24 hour   Intake --   Output 2000 ml   Net -2000 ml       Recent Labs     08/20/24  1329 08/21/24  0726 08/22/24  0657   WBC 9.6 6.7 7.3   HGB 10.8* 10.1* 11.0*   HCT 33.4* 30.1* 34.2*   MCV 96.5 94.1 95.8    159 193     Recent Labs     08/20/24  0909 08/20/24  1329 08/21/24  0726 08/22/24  0657   * 135* 134* 136   K 5.2 4.7 4.4 4.2    106 106 107   CO2 19* 17* 18* 19*   BUN 36*  --  34* 24*   CREATININE 2.6*  --  2.6* 2.4*       No results for input(s): \"COLORU\", \"PHUR\", \"LABCAST\", \"WBCUA\", \"RBCUA\", \"MUCUS\", \"TRICHOMONAS\", \"YEAST\", \"BACTERIA\", \"CLARITYU\", \"SPECGRAV\", \"LEUKOCYTESUR\", \"UROBILINOGEN\", \"BILIRUBINUR\", \"BLOODU\" in the last 72 hours.    Invalid input(s): \"NITRATE\", \"GLUCOSEUKETONESUAMORPHOUS\"    Additional Lab/culture results:  None    Physical Exam:  Constitutional: Patient in no acute distress.  Neuro: alert and oriented to person place and time.  HEENT: No acute abnormalities  Lungs: Respiratory effort normal on room air  Cardiovascular:  Heart rate regular rate and rhythm  Abdomen: Soft,  appropriately tender, mild distention and tympanic. Non peritonitic.  Incisions clean dry and intact  Extremities: No leg swelling, no edema  : Pittman catheter in place draining clear yellow urine    Interval Imaging Findings:  None    Impression:  79-year-old male, status post left robotic radical nephrectomy on 8/19    Plan:  -Regular diet  -Follow-up morning labs  -Maintain Pittman catheter for now  -Continue Flomax 0.4 mg daily  -Appreciate nephrology recommendations for postoperative creatinine elevation, they did sign off  -PT/OT evaluation ordered  -Discussion with social work about rehab vs going home  -Patient will need walk today  -Incentive spirometry 10 times per hour while awake  -Continue to hold anticoagulation for now  -DVT prophylaxis: SCD cuffs, 5000u SQH  -Disposition: Likely no discharge today    Rocco Power MD  Urology Resident, PGY-4

## 2024-08-22 NOTE — CARE COORDINATION
Met with the patient. Informed the physician completing the consult from [facility] states he would benefit from IPR. Patient states he prefers a facility that is closer to home. Requests referral be sent to [facility].    [id number] Referral sent to [facility].    [id number] Received a message from [facility]. States they are currently full. Will call tomorrow if be available.

## 2024-08-23 LAB
GLUCOSE BLD-MCNC: 142 MG/DL (ref 75–110)
GLUCOSE BLD-MCNC: 156 MG/DL (ref 75–110)
GLUCOSE BLD-MCNC: 158 MG/DL (ref 75–110)
GLUCOSE BLD-MCNC: 169 MG/DL (ref 75–110)
SURGICAL PATHOLOGY REPORT: NORMAL

## 2024-08-23 PROCEDURE — 6360000002 HC RX W HCPCS: Performed by: STUDENT IN AN ORGANIZED HEALTH CARE EDUCATION/TRAINING PROGRAM

## 2024-08-23 PROCEDURE — 2060000000 HC ICU INTERMEDIATE R&B

## 2024-08-23 PROCEDURE — 82947 ASSAY GLUCOSE BLOOD QUANT: CPT

## 2024-08-23 PROCEDURE — 6370000000 HC RX 637 (ALT 250 FOR IP): Performed by: INTERNAL MEDICINE

## 2024-08-23 PROCEDURE — 6370000000 HC RX 637 (ALT 250 FOR IP)

## 2024-08-23 PROCEDURE — 97116 GAIT TRAINING THERAPY: CPT

## 2024-08-23 PROCEDURE — 2580000003 HC RX 258

## 2024-08-23 PROCEDURE — 97110 THERAPEUTIC EXERCISES: CPT

## 2024-08-23 PROCEDURE — 97530 THERAPEUTIC ACTIVITIES: CPT

## 2024-08-23 RX ORDER — GLUCAGON 1 MG/ML
1 KIT INJECTION PRN
Status: DISCONTINUED | OUTPATIENT
Start: 2024-08-23 | End: 2024-08-27 | Stop reason: HOSPADM

## 2024-08-23 RX ORDER — DEXTROSE MONOHYDRATE 100 MG/ML
INJECTION, SOLUTION INTRAVENOUS CONTINUOUS PRN
Status: DISCONTINUED | OUTPATIENT
Start: 2024-08-23 | End: 2024-08-27 | Stop reason: HOSPADM

## 2024-08-23 RX ORDER — GABAPENTIN 600 MG/1
300 TABLET ORAL 3 TIMES DAILY
Status: DISCONTINUED | OUTPATIENT
Start: 2024-08-23 | End: 2024-08-27 | Stop reason: HOSPADM

## 2024-08-23 RX ADMIN — GABAPENTIN 300 MG: 600 TABLET, FILM COATED ORAL at 14:01

## 2024-08-23 RX ADMIN — METOPROLOL TARTRATE 12.5 MG: 25 TABLET, FILM COATED ORAL at 08:31

## 2024-08-23 RX ADMIN — SODIUM CHLORIDE, PRESERVATIVE FREE 10 ML: 5 INJECTION INTRAVENOUS at 08:40

## 2024-08-23 RX ADMIN — METHOCARBAMOL 500 MG: 500 TABLET ORAL at 13:55

## 2024-08-23 RX ADMIN — TAMSULOSIN HYDROCHLORIDE 0.4 MG: 0.4 CAPSULE ORAL at 08:31

## 2024-08-23 RX ADMIN — HEPARIN SODIUM 5000 UNITS: 5000 INJECTION INTRAVENOUS; SUBCUTANEOUS at 05:27

## 2024-08-23 RX ADMIN — METHOCARBAMOL 500 MG: 500 TABLET ORAL at 08:31

## 2024-08-23 RX ADMIN — GABAPENTIN 600 MG: 600 TABLET, FILM COATED ORAL at 08:31

## 2024-08-23 RX ADMIN — OXYCODONE HYDROCHLORIDE 10 MG: 5 TABLET ORAL at 21:34

## 2024-08-23 RX ADMIN — OXYCODONE HYDROCHLORIDE 10 MG: 5 TABLET ORAL at 13:55

## 2024-08-23 RX ADMIN — MELATONIN TAB 3 MG 6 MG: 3 TAB at 21:35

## 2024-08-23 RX ADMIN — DOCUSATE SODIUM 100 MG: 100 CAPSULE, LIQUID FILLED ORAL at 08:32

## 2024-08-23 RX ADMIN — INSULIN GLARGINE 20 UNITS: 100 INJECTION, SOLUTION SUBCUTANEOUS at 21:30

## 2024-08-23 RX ADMIN — GABAPENTIN 300 MG: 600 TABLET, FILM COATED ORAL at 21:29

## 2024-08-23 RX ADMIN — METOPROLOL TARTRATE 12.5 MG: 25 TABLET, FILM COATED ORAL at 21:29

## 2024-08-23 RX ADMIN — SODIUM CHLORIDE, PRESERVATIVE FREE 10 ML: 5 INJECTION INTRAVENOUS at 21:31

## 2024-08-23 RX ADMIN — SODIUM BICARBONATE 650 MG: 650 TABLET ORAL at 08:31

## 2024-08-23 RX ADMIN — SERTRALINE HYDROCHLORIDE 100 MG: 50 TABLET ORAL at 08:31

## 2024-08-23 RX ADMIN — METHOCARBAMOL 500 MG: 500 TABLET ORAL at 21:30

## 2024-08-23 RX ADMIN — HEPARIN SODIUM 5000 UNITS: 5000 INJECTION INTRAVENOUS; SUBCUTANEOUS at 21:29

## 2024-08-23 RX ADMIN — INSULIN GLARGINE 20 UNITS: 100 INJECTION, SOLUTION SUBCUTANEOUS at 08:32

## 2024-08-23 RX ADMIN — METHOCARBAMOL 500 MG: 500 TABLET ORAL at 18:19

## 2024-08-23 RX ADMIN — HEPARIN SODIUM 5000 UNITS: 5000 INJECTION INTRAVENOUS; SUBCUTANEOUS at 14:01

## 2024-08-23 ASSESSMENT — PAIN DESCRIPTION - DESCRIPTORS
DESCRIPTORS: ACHING
DESCRIPTORS: SHARP

## 2024-08-23 ASSESSMENT — PAIN SCALES - GENERAL
PAINLEVEL_OUTOF10: 0
PAINLEVEL_OUTOF10: 0
PAINLEVEL_OUTOF10: 7
PAINLEVEL_OUTOF10: 10

## 2024-08-23 ASSESSMENT — PAIN DESCRIPTION - LOCATION
LOCATION: ABDOMEN
LOCATION: BACK

## 2024-08-23 NOTE — CARE COORDINATION
Transitional Planning :  1034- Message from Honey at [facility] stating they are able to accept and have bed available and if precert should be started.   1120- Called central intake and LM they are 1st choice and to start precert for pt

## 2024-08-23 NOTE — PLAN OF CARE
Problem: Discharge Planning  Goal: Discharge to home or other facility with appropriate resources  8/23/2024 0210 by Krys Levine RN  Outcome: Progressing  8/22/2024 1858 by Kathia Rios RN  Outcome: Progressing     Problem: Pain  Goal: Verbalizes/displays adequate comfort level or baseline comfort level  8/23/2024 0210 by Krys Levine RN  Outcome: Progressing  8/22/2024 1858 by Kathia Rios RN  Outcome: Progressing     Problem: Chronic Conditions and Co-morbidities  Goal: Patient's chronic conditions and co-morbidity symptoms are monitored and maintained or improved  8/22/2024 1858 by Kathia Rios RN  Outcome: Progressing     Problem: Safety - Adult  Goal: Free from fall injury  8/22/2024 1858 by Kathia Rios, RN  Outcome: Progressing

## 2024-08-23 NOTE — PLAN OF CARE
Problem: Safety - Adult  Goal: Free from fall injury  8/23/2024 0617 by Krys Levine, RN  Outcome: Progressing  8/22/2024 1858 by Kathia Rios, RN  Outcome: Progressing

## 2024-08-23 NOTE — PROGRESS NOTES
Physical Therapy  Facility/Department: [address] ONC/MED SURG  Physical Therapy Daily Treatment Note    Name: Randy Caputo  : 1945  MRN: [id number]  Date of Service: 2024    Discharge Recommendations:  Patient would benefit from continued therapy after discharge   PT Equipment Recommendations  Equipment Needed: Yes  Mobility Devices: Walker  Walker: Rolling  Other: Pt requires assistance to ambulate at this time w/ RW.      Patient Diagnosis(es): The primary encounter diagnosis was Post-op pain. A diagnosis of Renal mass was also pertinent to this visit.  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  Past Surgical History:  has a past surgical history that includes Cardiac catheterization; Colonoscopy; Appendectomy; Tonsillectomy; Cystoscopy; Foot surgery (Right, 2023); total nephrectomy (Left, 2024); Kidney surgery (N/A, 2024); and Cystoscopy (Left, 2024).    Assessment  Body Structures, Functions, Activity Limitations Requiring Skilled Therapeutic Intervention: Decreased functional mobility ;Decreased tolerance to work activity;Decreased strength;Decreased endurance;Decreased balance;Increased pain  Assessment: Pt ambulated in 20 ft trials RW CGA, transfers with Abner/CGA and bed mobility Abner.  Pt presents with decline in gait, balance and strength with pain limiting ability. Pt  SBA.  Exercise Treatment: Supine Exercises: Ankle Pumps, Gluteal sets, Hamstring Sets, Heel Slides,  Quad Sets. Reps: x 10 reps with written HEP given to pt. Increased time to complete     OutComes Score  AM-PAC - Mobility  AM-PAC Basic Mobility - Inpatient   How much help is needed turning from your back to your side while in a flat bed without using bedrails?: A Little  How much help is needed moving from lying on your back to sitting on the side of a flat bed without using bedrails?: A Lot  How much help is needed moving to and from a bed to a chair?: A Little  How much help is needed standing up from a chair using your arms?: A Little  How much help is needed walking in hospital room?: A Little  How much help is needed climbing 3-5 steps with a railing?: A Lot  AM-PAC Inpatient Mobility Raw Score : 16  AM-PAC Inpatient T-Scale Score : 40.78  Mobility Inpatient CMS 0-100% Score: 54.16  Mobility Inpatient CMS G-Code Modifier : CK    Goals  Short Term Goals  Time Frame for Short Term Goals: 14 visits  Short Term Goal 1: Pt to complete bed mobility with mod I and sit EOB unsupported.  Short Term Goal 2: Pt to ambulate 100 ft mod I with least AD  Short Term Goal 3: Pt to transfer from alternate surface heights with Mod I demonstrating safety with AD  Short Term Goal 4: Pt to ascend/descend 4 steps with Mod I and rigth railing  Short Term Goal 5: Pt to be provided verbal, written and practical instruction in bilateral LE ROM exercises to improve general strength and activity tolerance.  Patient Goals   Patient Goals : To return home       Education  Patient Education  Education Given To: Patient  Education Provided: Role of Therapy;Precautions;Transfer Training;Equipment;Fall Prevention Strategies;Orientation;Energy Conservation  Education Provided Comments: importance of mobility.  Education Method: Verbal  Barriers to Learning: None  Education Outcome: Continued education needed;Demonstrated

## 2024-08-23 NOTE — PLAN OF CARE
Problem: Discharge Planning  Goal: Discharge to home or other facility with appropriate resources  Outcome: Progressing  Flowsheets (Taken 8/23/2024 0800)  Discharge to home or other facility with appropriate resources: Identify barriers to discharge with patient and caregiver     Problem: Chronic Conditions and Co-morbidities  Goal: Patient's chronic conditions and co-morbidity symptoms are monitored and maintained or improved  Outcome: Progressing  Flowsheets (Taken 8/23/2024 0800)  Care Plan - Patient's Chronic Conditions and Co-Morbidity Symptoms are Monitored and Maintained or Improved: Monitor and assess patient's chronic conditions and comorbid symptoms for stability, deterioration, or improvement     Problem: Pain  Goal: Verbalizes/displays adequate comfort level or baseline comfort level  Outcome: Progressing     Problem: Safety - Adult  Goal: Free from fall injury  8/23/2024 1757 by Ceci Martell, RN  Outcome: Progressing  8/23/2024 0617 by Krys Levine, RN  Outcome: Progressing     Problem: ABCDS Injury Assessment  Goal: Absence of physical injury  Outcome: Progressing     Problem: Skin/Tissue Integrity - Adult  Goal: Skin integrity remains intact  Outcome: Progressing  Goal: Incisions, wounds, or drain sites healing without S/S of infection  Outcome: Progressing  Goal: Oral mucous membranes remain intact  Outcome: Progressing     Problem: Musculoskeletal - Adult  Goal: Return mobility to safest level of function  Outcome: Progressing  Goal: Maintain proper alignment of affected body part  Outcome: Progressing  Goal: Return ADL status to a safe level of function  Outcome: Progressing     Problem: Gastrointestinal - Adult  Goal: Minimal or absence of nausea and vomiting  Outcome: Progressing  Goal: Maintains or returns to baseline bowel function  Outcome: Progressing  Goal: Maintains adequate nutritional intake  Outcome: Progressing  Goal: Establish and maintain optimal ostomy

## 2024-08-24 LAB
GLUCOSE BLD-MCNC: 148 MG/DL (ref 75–110)
GLUCOSE BLD-MCNC: 202 MG/DL (ref 75–110)
GLUCOSE BLD-MCNC: 216 MG/DL (ref 75–110)
GLUCOSE BLD-MCNC: 76 MG/DL (ref 75–110)

## 2024-08-24 PROCEDURE — 2580000003 HC RX 258

## 2024-08-24 PROCEDURE — 6360000002 HC RX W HCPCS: Performed by: STUDENT IN AN ORGANIZED HEALTH CARE EDUCATION/TRAINING PROGRAM

## 2024-08-24 PROCEDURE — 2060000000 HC ICU INTERMEDIATE R&B

## 2024-08-24 PROCEDURE — 6370000000 HC RX 637 (ALT 250 FOR IP): Performed by: INTERNAL MEDICINE

## 2024-08-24 PROCEDURE — 6370000000 HC RX 637 (ALT 250 FOR IP)

## 2024-08-24 PROCEDURE — 82947 ASSAY GLUCOSE BLOOD QUANT: CPT

## 2024-08-24 RX ADMIN — TAMSULOSIN HYDROCHLORIDE 0.4 MG: 0.4 CAPSULE ORAL at 08:50

## 2024-08-24 RX ADMIN — SODIUM BICARBONATE 650 MG: 650 TABLET ORAL at 08:49

## 2024-08-24 RX ADMIN — INSULIN LISPRO 2 UNITS: 100 INJECTION, SOLUTION INTRAVENOUS; SUBCUTANEOUS at 17:19

## 2024-08-24 RX ADMIN — GABAPENTIN 300 MG: 600 TABLET, FILM COATED ORAL at 20:52

## 2024-08-24 RX ADMIN — METHOCARBAMOL 500 MG: 500 TABLET ORAL at 08:49

## 2024-08-24 RX ADMIN — METOPROLOL TARTRATE 12.5 MG: 25 TABLET, FILM COATED ORAL at 08:49

## 2024-08-24 RX ADMIN — SERTRALINE HYDROCHLORIDE 100 MG: 50 TABLET ORAL at 08:50

## 2024-08-24 RX ADMIN — METHOCARBAMOL 500 MG: 500 TABLET ORAL at 20:52

## 2024-08-24 RX ADMIN — GABAPENTIN 300 MG: 600 TABLET, FILM COATED ORAL at 13:44

## 2024-08-24 RX ADMIN — SODIUM CHLORIDE, PRESERVATIVE FREE 10 ML: 5 INJECTION INTRAVENOUS at 08:50

## 2024-08-24 RX ADMIN — GABAPENTIN 300 MG: 600 TABLET, FILM COATED ORAL at 08:49

## 2024-08-24 RX ADMIN — INSULIN GLARGINE 20 UNITS: 100 INJECTION, SOLUTION SUBCUTANEOUS at 08:51

## 2024-08-24 RX ADMIN — DOCUSATE SODIUM 100 MG: 100 CAPSULE, LIQUID FILLED ORAL at 20:52

## 2024-08-24 RX ADMIN — INSULIN GLARGINE 20 UNITS: 100 INJECTION, SOLUTION SUBCUTANEOUS at 20:52

## 2024-08-24 RX ADMIN — OXYCODONE HYDROCHLORIDE 10 MG: 5 TABLET ORAL at 20:52

## 2024-08-24 RX ADMIN — OXYCODONE HYDROCHLORIDE 10 MG: 5 TABLET ORAL at 15:32

## 2024-08-24 RX ADMIN — SODIUM CHLORIDE, PRESERVATIVE FREE 10 ML: 5 INJECTION INTRAVENOUS at 20:53

## 2024-08-24 RX ADMIN — HEPARIN SODIUM 5000 UNITS: 5000 INJECTION INTRAVENOUS; SUBCUTANEOUS at 05:33

## 2024-08-24 RX ADMIN — METHOCARBAMOL 500 MG: 500 TABLET ORAL at 17:19

## 2024-08-24 RX ADMIN — HEPARIN SODIUM 5000 UNITS: 5000 INJECTION INTRAVENOUS; SUBCUTANEOUS at 13:44

## 2024-08-24 RX ADMIN — METOPROLOL TARTRATE 12.5 MG: 25 TABLET, FILM COATED ORAL at 20:52

## 2024-08-24 RX ADMIN — HEPARIN SODIUM 5000 UNITS: 5000 INJECTION INTRAVENOUS; SUBCUTANEOUS at 20:54

## 2024-08-24 RX ADMIN — METHOCARBAMOL 500 MG: 500 TABLET ORAL at 12:35

## 2024-08-24 ASSESSMENT — PAIN DESCRIPTION - LOCATION
LOCATION: ABDOMEN
LOCATION: HIP
LOCATION: ABDOMEN

## 2024-08-24 ASSESSMENT — PAIN SCALES - GENERAL
PAINLEVEL_OUTOF10: 7
PAINLEVEL_OUTOF10: 4
PAINLEVEL_OUTOF10: 3
PAINLEVEL_OUTOF10: 7
PAINLEVEL_OUTOF10: 5
PAINLEVEL_OUTOF10: 5
PAINLEVEL_OUTOF10: 7
PAINLEVEL_OUTOF10: 4

## 2024-08-24 ASSESSMENT — PAIN DESCRIPTION - ORIENTATION
ORIENTATION: RIGHT;LEFT
ORIENTATION: LEFT;RIGHT
ORIENTATION: LOWER;LEFT

## 2024-08-24 ASSESSMENT — PAIN DESCRIPTION - ONSET: ONSET: ON-GOING

## 2024-08-24 ASSESSMENT — PAIN DESCRIPTION - DIRECTION: RADIATING_TOWARDS: N/A.

## 2024-08-24 ASSESSMENT — PAIN DESCRIPTION - PAIN TYPE
TYPE: ACUTE PAIN;SURGICAL PAIN
TYPE: ACUTE PAIN;SURGICAL PAIN

## 2024-08-24 ASSESSMENT — PAIN - FUNCTIONAL ASSESSMENT
PAIN_FUNCTIONAL_ASSESSMENT: PREVENTS OR INTERFERES SOME ACTIVE ACTIVITIES AND ADLS
PAIN_FUNCTIONAL_ASSESSMENT: ACTIVITIES ARE NOT PREVENTED

## 2024-08-24 ASSESSMENT — PAIN DESCRIPTION - FREQUENCY
FREQUENCY: CONTINUOUS
FREQUENCY: CONTINUOUS

## 2024-08-24 ASSESSMENT — PAIN DESCRIPTION - DESCRIPTORS
DESCRIPTORS: ACHING
DESCRIPTORS: PRESSURE
DESCRIPTORS: ACHING;SORE

## 2024-08-24 NOTE — PROGRESS NOTES
[facility] Santacroce, Khan, Mostafa, Buck, Murtagh Jr  Urology Progress Note     Chief Complaint: Status post left radical nephrectomy 8/20/24    Subjective:  Nothing overnight  AFVSS  Doing very well  Walking more  Had BM yesterday  Passing flatus  Pain well controlled  Precert sent yday for rehab, patient agreeable to this    Patient Vitals for the past 24 hrs:   BP Temp Temp src Pulse Resp SpO2   08/24/24 0400 95/75 98.6 °F (37 °C) Temporal 62 17 90 %   08/24/24 0000 105/67 98 °F (36.7 °C) Temporal 65 16 90 %   08/23/24 2204 -- -- -- -- 18 --   08/23/24 2145 -- -- -- 65 -- 95 %   08/23/24 2130 120/67 -- -- 66 -- 96 %   08/23/24 1936 95/76 98.1 °F (36.7 °C) Oral 65 16 96 %   08/23/24 1559 115/63 98.2 °F (36.8 °C) Oral 69 18 95 %   08/23/24 1425 -- -- -- -- 16 --   08/23/24 1233 106/70 98.4 °F (36.9 °C) Oral 68 16 93 %       Intake/Output Summary (Last 24 hours) at 8/24/2024 0803  Last data filed at 8/24/2024 0544  Gross per 24 hour   Intake --   Output 700 ml   Net -700 ml       Recent Labs     08/22/24  0657   WBC 7.3   HGB 11.0*   HCT 34.2*   MCV 95.8        Recent Labs     08/22/24  0657      K 4.2      CO2 19*   BUN 24*   CREATININE 2.4*       No results for input(s): \"COLORU\", \"PHUR\", \"LABCAST\", \"WBCUA\", \"RBCUA\", \"MUCUS\", \"TRICHOMONAS\", \"YEAST\", \"BACTERIA\", \"CLARITYU\", \"SPECGRAV\", \"LEUKOCYTESUR\", \"UROBILINOGEN\", \"BILIRUBINUR\", \"BLOODU\" in the last 72 hours.    Invalid input(s): \"NITRATE\", \"GLUCOSEUKETONESUAMORPHOUS\"    Additional Lab/culture results:  None    Physical Exam:  Constitutional: Patient in no acute distress.  Neuro: alert and oriented to person place and time.  HEENT: No acute abnormalities  Lungs: Respiratory effort normal on room air  Cardiovascular:  Heart rate regular rate and rhythm  Abdomen: Soft, appropriately tender, mild distention and tympanic. Non peritonitic.  Incisions clean dry and intact  Extremities: No leg swelling, no edema  : Pittman catheter in place  draining clear yellow urine    Interval Imaging Findings:  None    Impression:  79-year-old male, status post left robotic radical nephrectomy on 8/19    Plan:  -Regular diet  -Follow-up morning labs  -Maintain Pittman catheter for now - will do repeat VT at DC  -Continue Flomax 0.4 mg daily  -Appreciate nephrology recommendations for postoperative creatinine elevation, they did sign off  -PT/OT evaluation ordered  -PM&R consulted, recommended acute rehab  -Incentive spirometry 10 times per hour while awake  -Continue to hold anticoagulation for now  -DVT prophylaxis: SCD cuffs, 5000u SQH  -Disposition: Patient cleared for discharge pending acute rehab acceptance, precert sent 8/23/24    Charlotte Layne DO, PGY-5  Urology Resident

## 2024-08-24 NOTE — PLAN OF CARE
Problem: Discharge Planning  Goal: Discharge to home or other facility with appropriate resources  8/24/2024 1616 by Holly Sutton, RN  Outcome: Progressing  Flowsheets (Taken 8/24/2024 0907)  Discharge to home or other facility with appropriate resources:   Identify barriers to discharge with patient and caregiver   Arrange for needed discharge resources and transportation as appropriate   Identify discharge learning needs (meds, wound care, etc)  8/24/2024 0620 by Juana Hood, RN  Outcome: Progressing     Problem: Chronic Conditions and Co-morbidities  Goal: Patient's chronic conditions and co-morbidity symptoms are monitored and maintained or improved  8/24/2024 1616 by Holly Sutton RN  Outcome: Progressing  Flowsheets (Taken 8/24/2024 0907)  Care Plan - Patient's Chronic Conditions and Co-Morbidity Symptoms are Monitored and Maintained or Improved:   Monitor and assess patient's chronic conditions and comorbid symptoms for stability, deterioration, or improvement   Collaborate with multidisciplinary team to address chronic and comorbid conditions and prevent exacerbation or deterioration   Update acute care plan with appropriate goals if chronic or comorbid symptoms are exacerbated and prevent overall improvement and discharge  8/24/2024 0620 by Juana Hood, RN  Outcome: Progressing     Problem: Pain  Goal: Verbalizes/displays adequate comfort level or baseline comfort level  8/24/2024 1616 by Holly Sutton RN  Outcome: Progressing  Flowsheets (Taken 8/24/2024 0846)  Verbalizes/displays adequate comfort level or baseline comfort level:   Encourage patient to monitor pain and request assistance   Assess pain using appropriate pain scale   Implement non-pharmacological measures as appropriate and evaluate response   Administer analgesics based on type and severity of pain and evaluate response  8/24/2024 0620 by Juana Hood, RN  Outcome: Progressing     Problem: Safety - Adult  Goal: Free

## 2024-08-24 NOTE — PLAN OF CARE
Problem: Discharge Planning  Goal: Discharge to home or other facility with appropriate resources  8/24/2024 0620 by Juana Hood RN  Outcome: Progressing  8/23/2024 1757 by Ceci Martell RN  Outcome: Progressing  Flowsheets (Taken 8/23/2024 0800)  Discharge to home or other facility with appropriate resources: Identify barriers to discharge with patient and caregiver     Problem: Chronic Conditions and Co-morbidities  Goal: Patient's chronic conditions and co-morbidity symptoms are monitored and maintained or improved  8/24/2024 0620 by Juana Hood RN  Outcome: Progressing  8/23/2024 1757 by Ceci Martell RN  Outcome: Progressing  Flowsheets (Taken 8/23/2024 0800)  Care Plan - Patient's Chronic Conditions and Co-Morbidity Symptoms are Monitored and Maintained or Improved: Monitor and assess patient's chronic conditions and comorbid symptoms for stability, deterioration, or improvement     Problem: Pain  Goal: Verbalizes/displays adequate comfort level or baseline comfort level  8/24/2024 0620 by Juana Hood RN  Outcome: Progressing  8/23/2024 1757 by Ceci Martlel RN  Outcome: Progressing     Problem: Safety - Adult  Goal: Free from fall injury  8/24/2024 0620 by Juana Hood RN  Outcome: Progressing  8/23/2024 1757 by Ceci Martell RN  Outcome: Progressing     Problem: ABCDS Injury Assessment  Goal: Absence of physical injury  8/24/2024 0620 by Juana Hood RN  Outcome: Progressing  8/23/2024 1757 by Ccei Martell RN  Outcome: Progressing  Flowsheets (Taken 8/23/2024 0800)  Absence of Physical Injury: Implement safety measures based on patient assessment     Problem: Skin/Tissue Integrity - Adult  Goal: Skin integrity remains intact  8/24/2024 0620 by Juana Hood RN  Outcome: Progressing  Flowsheets (Taken 8/23/2024 2156)  Skin Integrity Remains Intact: Monitor for areas of redness and/or skin breakdown  8/23/2024 1757 by Ceci Martell RN  Outcome: Progressing  Goal: Incisions, wounds,  or drain sites healing without S/S of infection  8/24/2024 0620 by Juana Hood RN  Outcome: Progressing  8/23/2024 1757 by Ceci Martell RN  Outcome: Progressing  Goal: Oral mucous membranes remain intact  8/24/2024 0620 by Juana Hood RN  Outcome: Progressing  8/23/2024 1757 by Ceci Martell RN  Outcome: Progressing     Problem: Musculoskeletal - Adult  Goal: Return mobility to safest level of function  8/24/2024 0620 by Juana Hood RN  Outcome: Progressing  8/23/2024 1757 by Ceci Martell RN  Outcome: Progressing  Goal: Maintain proper alignment of affected body part  8/24/2024 0620 by Juana Hood RN  Outcome: Progressing  8/23/2024 1757 by Ceci Martell RN  Outcome: Progressing  Goal: Return ADL status to a safe level of function  8/24/2024 0620 by Juana Hood RN  Outcome: Progressing  8/23/2024 1757 by Ceci Martell RN  Outcome: Progressing     Problem: Gastrointestinal - Adult  Goal: Minimal or absence of nausea and vomiting  8/24/2024 0620 by Juana Hood RN  Outcome: Progressing  8/23/2024 1757 by Ceci Martell RN  Outcome: Progressing  Goal: Maintains or returns to baseline bowel function  8/24/2024 0620 by Juana Hood RN  Outcome: Progressing  8/23/2024 1757 by Ceci Martell RN  Outcome: Progressing  Goal: Maintains adequate nutritional intake  8/24/2024 0620 by Juana Hood RN  Outcome: Progressing  8/23/2024 1757 by Ceci Martell RN  Outcome: Progressing  Goal: Establish and maintain optimal ostomy function  8/24/2024 0620 by Juana Hood RN  Outcome: Progressing  8/23/2024 1757 by Ceci Martell RN  Outcome: Progressing     Problem: Genitourinary - Adult  Goal: Absence of urinary retention  8/24/2024 0620 by Juana Hood RN  Outcome: Progressing  8/23/2024 1757 by Ceci Martell RN  Outcome: Progressing  Goal: Urinary catheter remains patent  8/24/2024 0620 by Juana Hood RN  Outcome: Progressing  8/23/202423/2024 1757 by Ceci Martell, RN  Outcome: Progressing

## 2024-08-25 LAB
ANION GAP SERPL CALCULATED.3IONS-SCNC: 7 MMOL/L (ref 9–16)
BUN SERPL-MCNC: 14 MG/DL (ref 8–23)
CALCIUM SERPL-MCNC: 8.7 MG/DL (ref 8.6–10.4)
CHLORIDE SERPL-SCNC: 102 MMOL/L (ref 98–107)
CO2 SERPL-SCNC: 27 MMOL/L (ref 20–31)
CREAT SERPL-MCNC: 2.2 MG/DL (ref 0.7–1.2)
ERYTHROCYTE [DISTWIDTH] IN BLOOD BY AUTOMATED COUNT: 14.9 % (ref 11.8–14.4)
GFR, ESTIMATED: 29 ML/MIN/1.73M2
GLUCOSE BLD-MCNC: 104 MG/DL (ref 75–110)
GLUCOSE BLD-MCNC: 138 MG/DL (ref 75–110)
GLUCOSE BLD-MCNC: 229 MG/DL (ref 75–110)
GLUCOSE BLD-MCNC: 74 MG/DL (ref 75–110)
GLUCOSE SERPL-MCNC: 105 MG/DL (ref 74–99)
HCT VFR BLD AUTO: 31.7 % (ref 40.7–50.3)
HGB BLD-MCNC: 11 G/DL (ref 13–17)
MCH RBC QN AUTO: 31.5 PG (ref 25.2–33.5)
MCHC RBC AUTO-ENTMCNC: 34.7 G/DL (ref 28.4–34.8)
MCV RBC AUTO: 90.8 FL (ref 82.6–102.9)
NRBC BLD-RTO: 0 PER 100 WBC
PLATELET # BLD AUTO: 224 K/UL (ref 138–453)
PMV BLD AUTO: 9.5 FL (ref 8.1–13.5)
POTASSIUM SERPL-SCNC: 4.7 MMOL/L (ref 3.7–5.3)
RBC # BLD AUTO: 3.49 M/UL (ref 4.21–5.77)
SODIUM SERPL-SCNC: 136 MMOL/L (ref 136–145)
WBC OTHER # BLD: 7.1 K/UL (ref 3.5–11.3)

## 2024-08-25 PROCEDURE — 80048 BASIC METABOLIC PNL TOTAL CA: CPT

## 2024-08-25 PROCEDURE — 6360000002 HC RX W HCPCS: Performed by: STUDENT IN AN ORGANIZED HEALTH CARE EDUCATION/TRAINING PROGRAM

## 2024-08-25 PROCEDURE — 6370000000 HC RX 637 (ALT 250 FOR IP): Performed by: INTERNAL MEDICINE

## 2024-08-25 PROCEDURE — 2060000000 HC ICU INTERMEDIATE R&B

## 2024-08-25 PROCEDURE — 36415 COLL VENOUS BLD VENIPUNCTURE: CPT

## 2024-08-25 PROCEDURE — 94761 N-INVAS EAR/PLS OXIMETRY MLT: CPT

## 2024-08-25 PROCEDURE — 2580000003 HC RX 258

## 2024-08-25 PROCEDURE — 82947 ASSAY GLUCOSE BLOOD QUANT: CPT

## 2024-08-25 PROCEDURE — 6370000000 HC RX 637 (ALT 250 FOR IP)

## 2024-08-25 PROCEDURE — 85027 COMPLETE CBC AUTOMATED: CPT

## 2024-08-25 RX ORDER — 0.9 % SODIUM CHLORIDE 0.9 %
500 INTRAVENOUS SOLUTION INTRAVENOUS ONCE
Status: COMPLETED | OUTPATIENT
Start: 2024-08-25 | End: 2024-08-25

## 2024-08-25 RX ADMIN — HEPARIN SODIUM 5000 UNITS: 5000 INJECTION INTRAVENOUS; SUBCUTANEOUS at 06:53

## 2024-08-25 RX ADMIN — HEPARIN SODIUM 5000 UNITS: 5000 INJECTION INTRAVENOUS; SUBCUTANEOUS at 13:32

## 2024-08-25 RX ADMIN — DOCUSATE SODIUM 100 MG: 100 CAPSULE, LIQUID FILLED ORAL at 09:20

## 2024-08-25 RX ADMIN — SODIUM CHLORIDE: 9 INJECTION, SOLUTION INTRAVENOUS at 12:57

## 2024-08-25 RX ADMIN — GABAPENTIN 300 MG: 600 TABLET, FILM COATED ORAL at 09:18

## 2024-08-25 RX ADMIN — SERTRALINE HYDROCHLORIDE 100 MG: 50 TABLET ORAL at 09:20

## 2024-08-25 RX ADMIN — SODIUM BICARBONATE 650 MG: 650 TABLET ORAL at 09:20

## 2024-08-25 RX ADMIN — GABAPENTIN 300 MG: 600 TABLET, FILM COATED ORAL at 22:00

## 2024-08-25 RX ADMIN — HEPARIN SODIUM 5000 UNITS: 5000 INJECTION INTRAVENOUS; SUBCUTANEOUS at 22:00

## 2024-08-25 RX ADMIN — METOPROLOL TARTRATE 12.5 MG: 25 TABLET, FILM COATED ORAL at 09:19

## 2024-08-25 RX ADMIN — GABAPENTIN 300 MG: 600 TABLET, FILM COATED ORAL at 13:32

## 2024-08-25 RX ADMIN — SODIUM CHLORIDE, PRESERVATIVE FREE 10 ML: 5 INJECTION INTRAVENOUS at 22:03

## 2024-08-25 RX ADMIN — DOCUSATE SODIUM 100 MG: 100 CAPSULE, LIQUID FILLED ORAL at 22:00

## 2024-08-25 RX ADMIN — METHOCARBAMOL 500 MG: 500 TABLET ORAL at 22:00

## 2024-08-25 RX ADMIN — INSULIN GLARGINE 20 UNITS: 100 INJECTION, SOLUTION SUBCUTANEOUS at 22:00

## 2024-08-25 RX ADMIN — INSULIN LISPRO 2 UNITS: 100 INJECTION, SOLUTION INTRAVENOUS; SUBCUTANEOUS at 17:04

## 2024-08-25 RX ADMIN — METOPROLOL TARTRATE 12.5 MG: 25 TABLET, FILM COATED ORAL at 22:00

## 2024-08-25 RX ADMIN — SODIUM CHLORIDE, PRESERVATIVE FREE 10 ML: 5 INJECTION INTRAVENOUS at 09:20

## 2024-08-25 RX ADMIN — SODIUM CHLORIDE 500 ML: 9 INJECTION, SOLUTION INTRAVENOUS at 13:21

## 2024-08-25 RX ADMIN — METHOCARBAMOL 500 MG: 500 TABLET ORAL at 17:04

## 2024-08-25 RX ADMIN — TAMSULOSIN HYDROCHLORIDE 0.4 MG: 0.4 CAPSULE ORAL at 09:18

## 2024-08-25 RX ADMIN — METHOCARBAMOL 500 MG: 500 TABLET ORAL at 09:18

## 2024-08-25 ASSESSMENT — PAIN SCALES - GENERAL
PAINLEVEL_OUTOF10: 5
PAINLEVEL_OUTOF10: 4
PAINLEVEL_OUTOF10: 3

## 2024-08-25 ASSESSMENT — PAIN DESCRIPTION - LOCATION
LOCATION: ABDOMEN
LOCATION: ABDOMEN

## 2024-08-25 ASSESSMENT — PAIN DESCRIPTION - DESCRIPTORS
DESCRIPTORS: ACHING
DESCRIPTORS: SORE

## 2024-08-25 ASSESSMENT — PAIN DESCRIPTION - ORIENTATION
ORIENTATION: LEFT;LOWER
ORIENTATION: RIGHT;LOWER

## 2024-08-25 NOTE — PLAN OF CARE
Problem: Discharge Planning  Goal: Discharge to home or other facility with appropriate resources  8/24/2024 2322 by Brenda Lynn RN  Outcome: Progressing  8/24/2024 1616 by Holly Sutton RN  Outcome: Progressing  Flowsheets (Taken 8/24/2024 0907)  Discharge to home or other facility with appropriate resources:   Identify barriers to discharge with patient and caregiver   Arrange for needed discharge resources and transportation as appropriate   Identify discharge learning needs (meds, wound care, etc)     Problem: Chronic Conditions and Co-morbidities  Goal: Patient's chronic conditions and co-morbidity symptoms are monitored and maintained or improved  8/24/2024 2322 by Brenda Lynn RN  Outcome: Progressing  8/24/2024 1616 by Holly Sutton RN  Outcome: Progressing  Flowsheets (Taken 8/24/2024 0907)  Care Plan - Patient's Chronic Conditions and Co-Morbidity Symptoms are Monitored and Maintained or Improved:   Monitor and assess patient's chronic conditions and comorbid symptoms for stability, deterioration, or improvement   Collaborate with multidisciplinary team to address chronic and comorbid conditions and prevent exacerbation or deterioration   Update acute care plan with appropriate goals if chronic or comorbid symptoms are exacerbated and prevent overall improvement and discharge     Problem: Pain  Goal: Verbalizes/displays adequate comfort level or baseline comfort level  8/24/2024 2322 by Brenda Lynn RN  Outcome: Progressing  8/24/2024 1616 by Holly Sutton RN  Outcome: Progressing  Flowsheets (Taken 8/24/2024 0846)  Verbalizes/displays adequate comfort level or baseline comfort level:   Encourage patient to monitor pain and request assistance   Assess pain using appropriate pain scale   Implement non-pharmacological measures as appropriate and evaluate response   Administer analgesics based on type and severity of pain and evaluate response     Problem: Safety - Adult  Goal: Free  Assess patient stability and activity tolerance for standing, transferring and ambulating with or without assistive devices   Assist with transfers and ambulation using safe patient handling equipment as needed  Goal: Maintain proper alignment of affected body part  8/24/2024 2322 by Brenda Lynn RN  Outcome: Progressing  8/24/2024 1616 by Holly Sutton RN  Outcome: Progressing  Flowsheets (Taken 8/24/2024 0907)  Maintain proper alignment of affected body part: Support and protect limb and body alignment per provider's orders  Goal: Return ADL status to a safe level of function  8/24/2024 2322 by Brenda Lynn RN  Outcome: Progressing  8/24/2024 1616 by Holly Sutton RN  Outcome: Progressing  Flowsheets (Taken 8/24/2024 0907)  Return ADL Status to a Safe Level of Function:   Administer medication as ordered   Assess activities of daily living deficits and provide assistive devices as needed     Problem: Gastrointestinal - Adult  Goal: Minimal or absence of nausea and vomiting  8/24/2024 2322 by Brenda Lynn RN  Outcome: Progressing  8/24/2024 1616 by Holly Sutton RN  Outcome: Progressing  Flowsheets (Taken 8/24/2024 0907)  Minimal or absence of nausea and vomiting: Administer IV fluids as ordered to ensure adequate hydration  Goal: Maintains or returns to baseline bowel function  8/24/2024 2322 by Brenda Lynn RN  Outcome: Progressing  8/24/2024 1616 by Holly Sutton RN  Outcome: Progressing  Flowsheets (Taken 8/24/2024 0907)  Maintains or returns to baseline bowel function: Assess bowel function  Goal: Maintains adequate nutritional intake  8/24/2024 2322 by Brenda Lynn RN  Outcome: Progressing  8/24/2024 1616 by Holly Sutton RN  Outcome: Progressing  Flowsheets (Taken 8/24/2024 0907)  Maintains adequate nutritional intake: Monitor percentage of each meal consumed  Goal: Establish and maintain optimal ostomy function  8/24/2024 2322 by Brenda Lynn RN  Outcome:

## 2024-08-25 NOTE — PLAN OF CARE
Problem: Discharge Planning  Goal: Discharge to home or other facility with appropriate resources  Outcome: Progressing  Flowsheets (Taken 8/25/2024 0931)  Discharge to home or other facility with appropriate resources:   Identify barriers to discharge with patient and caregiver   Arrange for needed discharge resources and transportation as appropriate   Identify discharge learning needs (meds, wound care, etc)     Problem: Chronic Conditions and Co-morbidities  Goal: Patient's chronic conditions and co-morbidity symptoms are monitored and maintained or improved  Outcome: Progressing  Flowsheets (Taken 8/25/2024 0931)  Care Plan - Patient's Chronic Conditions and Co-Morbidity Symptoms are Monitored and Maintained or Improved:   Monitor and assess patient's chronic conditions and comorbid symptoms for stability, deterioration, or improvement   Collaborate with multidisciplinary team to address chronic and comorbid conditions and prevent exacerbation or deterioration   Update acute care plan with appropriate goals if chronic or comorbid symptoms are exacerbated and prevent overall improvement and discharge     Problem: Pain  Goal: Verbalizes/displays adequate comfort level or baseline comfort level  Outcome: Progressing  Flowsheets (Taken 8/25/2024 1618)  Verbalizes/displays adequate comfort level or baseline comfort level:   Encourage patient to monitor pain and request assistance   Assess pain using appropriate pain scale   Administer analgesics based on type and severity of pain and evaluate response   Implement non-pharmacological measures as appropriate and evaluate response     Problem: Safety - Adult  Goal: Free from fall injury  Outcome: Progressing     Problem: ABCDS Injury Assessment  Goal: Absence of physical injury  Outcome: Progressing     Problem: Skin/Tissue Integrity - Adult  Goal: Skin integrity remains intact  Outcome: Progressing  Flowsheets (Taken 8/25/2024 0931)  Skin Integrity Remains Intact:  or returns to baseline bowel function  Outcome: Progressing  Flowsheets (Taken 8/25/2024 0931)  Maintains or returns to baseline bowel function: Assess bowel function  Goal: Maintains adequate nutritional intake  Outcome: Progressing  Flowsheets (Taken 8/25/2024 0931)  Maintains adequate nutritional intake:   Monitor percentage of each meal consumed   Identify factors contributing to decreased intake, treat as appropriate  Goal: Establish and maintain optimal ostomy function  Outcome: Progressing  Flowsheets (Taken 8/25/2024 0931)  Establish and maintain optimal ostomy function: Monitor output from ostomies     Problem: Genitourinary - Adult  Goal: Absence of urinary retention  Outcome: Progressing  Flowsheets (Taken 8/25/2024 0931)  Absence of urinary retention:   Assess patient’s ability to void and empty bladder   Monitor intake/output and perform bladder scan as needed  Goal: Urinary catheter remains patent  Outcome: Progressing  Flowsheets (Taken 8/25/2024 0931)  Urinary catheter remains patent: Assess patency of urinary catheter

## 2024-08-25 NOTE — PROGRESS NOTES
[facility] Santacroce, Khan, Mostafa, Buck, Murtagh Jr  Urology Progress Note     Chief Complaint: Status post left radical nephrectomy 8/20/24    Subjective:  Nothing acute overnight  Continues to do well, feels stronger today  Walked in hallways yesterday, sat in chair for multiple hours  Continues to tolerate diet and fluids without any issues  Passing flatus    Patient Vitals for the past 24 hrs:   BP Temp Temp src Pulse Resp SpO2   08/25/24 0830 116/75 98.6 °F (37 °C) -- -- -- --   08/25/24 0344 -- -- -- 67 -- --   08/25/24 0340 112/68 98.4 °F (36.9 °C) Oral 67 16 92 %   08/25/24 0000 117/65 98.7 °F (37.1 °C) Oral 71 18 94 %   08/24/24 2031 133/69 98.8 °F (37.1 °C) Oral 74 16 95 %   08/24/24 1602 -- -- -- -- 17 --   08/24/24 1554 120/67 -- -- 72 -- 93 %   08/24/24 1553 -- 98.1 °F (36.7 °C) Oral 71 -- 94 %   08/24/24 1149 110/83 98.4 °F (36.9 °C) Oral -- 18 --       Intake/Output Summary (Last 24 hours) at 8/25/2024 0929  Last data filed at 8/25/2024 0600  Gross per 24 hour   Intake 720 ml   Output 1780 ml   Net -1060 ml       No results for input(s): \"WBC\", \"HGB\", \"HCT\", \"MCV\", \"PLT\" in the last 72 hours.    No results for input(s): \"NA\", \"K\", \"CL\", \"CO2\", \"PHOS\", \"BUN\", \"CREATININE\" in the last 72 hours.    Invalid input(s): \"CA\"      No results for input(s): \"COLORU\", \"PHUR\", \"LABCAST\", \"WBCUA\", \"RBCUA\", \"MUCUS\", \"TRICHOMONAS\", \"YEAST\", \"BACTERIA\", \"CLARITYU\", \"SPECGRAV\", \"LEUKOCYTESUR\", \"UROBILINOGEN\", \"BILIRUBINUR\", \"BLOODU\" in the last 72 hours.    Invalid input(s): \"NITRATE\", \"GLUCOSEUKETONESUAMORPHOUS\"    Additional Lab/culture results:  None    Physical Exam:  Constitutional: Patient in no acute distress.  Neuro: alert and oriented to person place and time.  HEENT: No acute abnormalities  Lungs: Respiratory effort normal on room air  Cardiovascular:  Heart rate regular rate and rhythm  Abdomen: Soft, appropriately tender, mild distention and tympanic. Non peritonitic.  Incisions clean dry and

## 2024-08-26 LAB
ANION GAP SERPL CALCULATED.3IONS-SCNC: 9 MMOL/L (ref 9–16)
BUN SERPL-MCNC: 14 MG/DL (ref 8–23)
CALCIUM SERPL-MCNC: 8.4 MG/DL (ref 8.6–10.4)
CHLORIDE SERPL-SCNC: 104 MMOL/L (ref 98–107)
CO2 SERPL-SCNC: 22 MMOL/L (ref 20–31)
CREAT SERPL-MCNC: 2 MG/DL (ref 0.7–1.2)
ERYTHROCYTE [DISTWIDTH] IN BLOOD BY AUTOMATED COUNT: 14.8 % (ref 11.8–14.4)
GFR, ESTIMATED: 34 ML/MIN/1.73M2
GLUCOSE BLD-MCNC: 106 MG/DL (ref 75–110)
GLUCOSE BLD-MCNC: 129 MG/DL (ref 75–110)
GLUCOSE BLD-MCNC: 138 MG/DL (ref 75–110)
GLUCOSE BLD-MCNC: 169 MG/DL (ref 75–110)
GLUCOSE SERPL-MCNC: 109 MG/DL (ref 74–99)
HCT VFR BLD AUTO: 30.2 % (ref 40.7–50.3)
HGB BLD-MCNC: 10 G/DL (ref 13–17)
MCH RBC QN AUTO: 31.6 PG (ref 25.2–33.5)
MCHC RBC AUTO-ENTMCNC: 33.1 G/DL (ref 28.4–34.8)
MCV RBC AUTO: 95.6 FL (ref 82.6–102.9)
NRBC BLD-RTO: 0 PER 100 WBC
PLATELET # BLD AUTO: 248 K/UL (ref 138–453)
PMV BLD AUTO: 9.9 FL (ref 8.1–13.5)
POTASSIUM SERPL-SCNC: 4.2 MMOL/L (ref 3.7–5.3)
RBC # BLD AUTO: 3.16 M/UL (ref 4.21–5.77)
SODIUM SERPL-SCNC: 135 MMOL/L (ref 136–145)
WBC OTHER # BLD: 6.5 K/UL (ref 3.5–11.3)

## 2024-08-26 PROCEDURE — 6360000002 HC RX W HCPCS: Performed by: STUDENT IN AN ORGANIZED HEALTH CARE EDUCATION/TRAINING PROGRAM

## 2024-08-26 PROCEDURE — 82947 ASSAY GLUCOSE BLOOD QUANT: CPT

## 2024-08-26 PROCEDURE — 97116 GAIT TRAINING THERAPY: CPT

## 2024-08-26 PROCEDURE — 97535 SELF CARE MNGMENT TRAINING: CPT

## 2024-08-26 PROCEDURE — 36415 COLL VENOUS BLD VENIPUNCTURE: CPT

## 2024-08-26 PROCEDURE — 80048 BASIC METABOLIC PNL TOTAL CA: CPT

## 2024-08-26 PROCEDURE — 97530 THERAPEUTIC ACTIVITIES: CPT

## 2024-08-26 PROCEDURE — 2060000000 HC ICU INTERMEDIATE R&B

## 2024-08-26 PROCEDURE — 6370000000 HC RX 637 (ALT 250 FOR IP)

## 2024-08-26 PROCEDURE — 97110 THERAPEUTIC EXERCISES: CPT

## 2024-08-26 PROCEDURE — 2580000003 HC RX 258

## 2024-08-26 PROCEDURE — 6370000000 HC RX 637 (ALT 250 FOR IP): Performed by: INTERNAL MEDICINE

## 2024-08-26 PROCEDURE — 85027 COMPLETE CBC AUTOMATED: CPT

## 2024-08-26 RX ADMIN — HEPARIN SODIUM 5000 UNITS: 5000 INJECTION INTRAVENOUS; SUBCUTANEOUS at 06:17

## 2024-08-26 RX ADMIN — SERTRALINE HYDROCHLORIDE 100 MG: 50 TABLET ORAL at 09:30

## 2024-08-26 RX ADMIN — DOCUSATE SODIUM 100 MG: 100 CAPSULE, LIQUID FILLED ORAL at 20:44

## 2024-08-26 RX ADMIN — SODIUM CHLORIDE, PRESERVATIVE FREE 10 ML: 5 INJECTION INTRAVENOUS at 09:33

## 2024-08-26 RX ADMIN — HEPARIN SODIUM 5000 UNITS: 5000 INJECTION INTRAVENOUS; SUBCUTANEOUS at 13:44

## 2024-08-26 RX ADMIN — INSULIN GLARGINE 20 UNITS: 100 INJECTION, SOLUTION SUBCUTANEOUS at 20:43

## 2024-08-26 RX ADMIN — METOPROLOL TARTRATE 12.5 MG: 25 TABLET, FILM COATED ORAL at 09:31

## 2024-08-26 RX ADMIN — INSULIN GLARGINE 20 UNITS: 100 INJECTION, SOLUTION SUBCUTANEOUS at 09:32

## 2024-08-26 RX ADMIN — TAMSULOSIN HYDROCHLORIDE 0.4 MG: 0.4 CAPSULE ORAL at 09:32

## 2024-08-26 RX ADMIN — METHOCARBAMOL 500 MG: 500 TABLET ORAL at 18:20

## 2024-08-26 RX ADMIN — GABAPENTIN 300 MG: 600 TABLET, FILM COATED ORAL at 09:30

## 2024-08-26 RX ADMIN — SODIUM BICARBONATE 650 MG: 650 TABLET ORAL at 09:31

## 2024-08-26 RX ADMIN — DOCUSATE SODIUM 100 MG: 100 CAPSULE, LIQUID FILLED ORAL at 09:32

## 2024-08-26 RX ADMIN — SODIUM CHLORIDE: 9 INJECTION, SOLUTION INTRAVENOUS at 10:59

## 2024-08-26 RX ADMIN — METHOCARBAMOL 500 MG: 500 TABLET ORAL at 13:43

## 2024-08-26 RX ADMIN — GABAPENTIN 300 MG: 600 TABLET, FILM COATED ORAL at 20:49

## 2024-08-26 RX ADMIN — GABAPENTIN 300 MG: 600 TABLET, FILM COATED ORAL at 13:43

## 2024-08-26 RX ADMIN — METHOCARBAMOL 500 MG: 500 TABLET ORAL at 09:30

## 2024-08-26 RX ADMIN — METOPROLOL TARTRATE 12.5 MG: 25 TABLET, FILM COATED ORAL at 20:44

## 2024-08-26 RX ADMIN — HEPARIN SODIUM 5000 UNITS: 5000 INJECTION INTRAVENOUS; SUBCUTANEOUS at 20:43

## 2024-08-26 RX ADMIN — METHOCARBAMOL 500 MG: 500 TABLET ORAL at 20:43

## 2024-08-26 RX ADMIN — SODIUM CHLORIDE, PRESERVATIVE FREE 10 ML: 5 INJECTION INTRAVENOUS at 20:43

## 2024-08-26 NOTE — PROGRESS NOTES
help is needed moving to and from a bed to a chair?: A Little  How much help is needed standing up from a chair using your arms?: A Little  How much help is needed walking in hospital room?: A Little  How much help is needed climbing 3-5 steps with a railing?: A Lot  AM-PAC Inpatient Mobility Raw Score : 16  AM-PAC Inpatient T-Scale Score : 40.78  Mobility Inpatient CMS 0-100% Score: 54.16  Mobility Inpatient CMS G-Code Modifier : CK       Goals  Short Term Goals  Time Frame for Short Term Goals: 14 visits  Short Term Goal 1: Pt to complete bed mobility with mod I and sit EOB unsupported.  Short Term Goal 2: Pt to ambulate 100 ft mod I with least AD  Short Term Goal 3: Pt to transfer from alternate surface heights with Mod I demonstrating safety with AD  Short Term Goal 4: Pt to ascend/descend 4 steps with Mod I and rigth railing  Short Term Goal 5: Pt to be provided verbal, written and practical instruction in bilateral LE ROM exercises to improve general strength and activity tolerance.  Patient Goals   Patient Goals : To return home       Education  Patient Education  Education Given To: Patient  Education Provided: Role of Therapy;Precautions;Transfer Training;Equipment;Fall Prevention Strategies;Orientation  Education Provided Comments: safety with mobility  Education Method: Verbal  Barriers to Learning: None  Education Outcome: Continued education needed;Demonstrated understanding      Therapy Time   Individual Concurrent Group Co-treatment   Time In 1325         Time Out 1431         Minutes 66         Timed Code Treatment Minutes: 48 Minutes       MASON SUH PTA

## 2024-08-26 NOTE — PLAN OF CARE
Problem: Discharge Planning  Goal: Discharge to home or other facility with appropriate resources  8/26/2024 0511 by Nell Cisse RN  Outcome: Progressing  8/25/2024 1618 by Holly Sutton RN  Outcome: Progressing  Flowsheets (Taken 8/25/2024 0931)  Discharge to home or other facility with appropriate resources:   Identify barriers to discharge with patient and caregiver   Arrange for needed discharge resources and transportation as appropriate   Identify discharge learning needs (meds, wound care, etc)     Problem: Chronic Conditions and Co-morbidities  Goal: Patient's chronic conditions and co-morbidity symptoms are monitored and maintained or improved  8/26/2024 0511 by Nell Cisse RN  Outcome: Progressing  8/25/2024 1618 by Holly Sutton RN  Outcome: Progressing  Flowsheets (Taken 8/25/2024 0931)  Care Plan - Patient's Chronic Conditions and Co-Morbidity Symptoms are Monitored and Maintained or Improved:   Monitor and assess patient's chronic conditions and comorbid symptoms for stability, deterioration, or improvement   Collaborate with multidisciplinary team to address chronic and comorbid conditions and prevent exacerbation or deterioration   Update acute care plan with appropriate goals if chronic or comorbid symptoms are exacerbated and prevent overall improvement and discharge     Problem: Pain  Goal: Verbalizes/displays adequate comfort level or baseline comfort level  8/26/2024 0511 by Nell Cisse RN  Outcome: Progressing  8/25/2024 1618 by Holly Sutton RN  Outcome: Progressing  Flowsheets (Taken 8/25/2024 1618)  Verbalizes/displays adequate comfort level or baseline comfort level:   Encourage patient to monitor pain and request assistance   Assess pain using appropriate pain scale   Administer analgesics based on type and severity of pain and evaluate response   Implement non-pharmacological measures as appropriate and evaluate response     Problem: Safety - Adult  Goal: Free  from fall injury  8/26/2024 0511 by Nell Cisse RN  Outcome: Progressing  Flowsheets (Taken 8/25/2024 1626 by Holly Sutton RN)  Free From Fall Injury: Instruct family/caregiver on patient safety  8/25/2024 1618 by Holly Sutton RN  Outcome: Progressing     Problem: ABCDS Injury Assessment  Goal: Absence of physical injury  8/26/2024 0511 by Nell Cisse RN  Outcome: Progressing  Flowsheets (Taken 8/25/2024 1626 by Holly Sutton RN)  Absence of Physical Injury: Implement safety measures based on patient assessment  8/25/2024 1618 by Holly Sutton RN  Outcome: Progressing     Problem: Skin/Tissue Integrity - Adult  Goal: Skin integrity remains intact  8/26/2024 0511 by Nell Cisse RN  Outcome: Progressing  Flowsheets (Taken 8/25/2024 1626 by Holly Sutton RN)  Skin Integrity Remains Intact: Monitor for areas of redness and/or skin breakdown  8/25/2024 1618 by Holly Sutton RN  Outcome: Progressing  Flowsheets (Taken 8/25/2024 0931)  Skin Integrity Remains Intact: Monitor for areas of redness and/or skin breakdown  Goal: Incisions, wounds, or drain sites healing without S/S of infection  8/26/2024 0511 by Nell Cisse RN  Outcome: Progressing  Flowsheets (Taken 8/25/2024 1626 by Holly Sutton RN)  Incisions, Wounds, or Drain Sites Healing Without Sign and Symptoms of Infection: ADMISSION and DAILY: Assess and document risk factors for pressure ulcer development  8/25/2024 1618 by Holly Sutton RN  Outcome: Progressing  Flowsheets (Taken 8/25/2024 0931)  Incisions, Wounds, or Drain Sites Healing Without Sign and Symptoms of Infection: ADMISSION and DAILY: Assess and document risk factors for pressure ulcer development  Goal: Oral mucous membranes remain intact  8/26/2024 0511 by Nell Cisse RN  Outcome: Progressing  8/25/2024 1618 by Holly Sutton RN  Outcome: Progressing  Flowsheets (Taken 8/25/2024 0931)  Oral Mucous Membranes Remain Intact:   Assess oral

## 2024-08-26 NOTE — PROGRESS NOTES
[facility] Santacroce, Khan, Mostafa, Buck, Murtagh Jr  Urology Progress Note     Chief Complaint: Status post left radical nephrectomy 8/20/24    Subjective:  Either confused or \"acting silly\" last night and this morning  Having   Nothing acute overnight  Continues to do well, feels stronger today  Walked in hallways yesterday, sat in chair for multiple hours  Continues to tolerate diet and fluids without any issues  Passing flatus    Patient Vitals for the past 24 hrs:   BP Temp Temp src Pulse Resp SpO2   08/26/24 0400 134/75 98.2 °F (36.8 °C) Oral 80 18 93 %   08/26/24 0000 129/82 98.6 °F (37 °C) Oral 84 18 93 %   08/25/24 1944 134/75 98.8 °F (37.1 °C) Oral -- 18 --   08/25/24 1619 136/79 98.9 °F (37.2 °C) Oral -- 16 --   08/25/24 1618 -- -- -- 74 -- 94 %   08/25/24 1325 119/70 -- -- 68 -- (!) 88 %   08/25/24 1245 (!) 88/54 -- -- 74 -- 94 %   08/25/24 1200 123/69 98.6 °F (37 °C) -- -- -- 91 %   08/25/24 0830 116/75 98.6 °F (37 °C) Oral 73 17 --       Intake/Output Summary (Last 24 hours) at 8/26/2024 0724  Last data filed at 8/25/2024 1840  Gross per 24 hour   Intake 508.88 ml   Output 800 ml   Net -291.12 ml       Recent Labs     08/25/24  1137   WBC 7.1   HGB 11.0*   HCT 31.7*   MCV 90.8          Recent Labs     08/25/24  1137      K 4.7      CO2 27   BUN 14   CREATININE 2.2*         No results for input(s): \"COLORU\", \"PHUR\", \"LABCAST\", \"WBCUA\", \"RBCUA\", \"MUCUS\", \"TRICHOMONAS\", \"YEAST\", \"BACTERIA\", \"CLARITYU\", \"SPECGRAV\", \"LEUKOCYTESUR\", \"UROBILINOGEN\", \"BILIRUBINUR\", \"BLOODU\" in the last 72 hours.    Invalid input(s): \"NITRATE\", \"GLUCOSEUKETONESUAMORPHOUS\"    Additional Lab/culture results:  None    Physical Exam:  Constitutional: Patient in no acute distress.  Neuro: alert and oriented to person place and time.  HEENT: No acute abnormalities  Lungs: Respiratory effort normal on room air  Cardiovascular:  Heart rate regular rate and rhythm  Abdomen: Soft, appropriately tender, mild

## 2024-08-26 NOTE — PROGRESS NOTES
Occupational Therapy  Facility/Department: [address] ONC/MED SURG  Occupational Therapy Daily Treatment Note    Name: Randy Caputo  : 1945  MRN: [id number]  Date of Service: 2024    Discharge Recommendations:  Patient would benefit from continued therapy after discharge in order to increase pt balance, strength and independence with ADL tasks and functional transfers          Patient Diagnosis(es): The primary encounter diagnosis was Post-op pain. A diagnosis of Renal mass was also pertinent to this visit.  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  Past Surgical History:  has a past surgical history that includes Cardiac catheterization; Colonoscopy; Appendectomy; Tonsillectomy; Cystoscopy; Foot surgery (Right, 2023); total nephrectomy (Left, 2024); Kidney surgery (N/A, 2024); and Cystoscopy (Left, 2024).           Assessment  Performance deficits / Impairments: Decreased functional mobility ;Decreased ADL status;Decreased endurance;Decreased balance;Decreased high-level IADLs;Decreased strength;Decreased ROM;Decreased cognition;Decreased safe awareness  Prognosis: Good  REQUIRES OT FOLLOW-UP: Yes  Activity Tolerance  Activity Tolerance: Treatment limited secondary to decreased cognition;Patient limited by fatigue     Plan  Occupational Therapy Plan  Times Per Week:

## 2024-08-26 NOTE — PLAN OF CARE
Problem: Discharge Planning  Goal: Discharge to home or other facility with appropriate resources  8/26/2024 1844 by Kathia Rios RN  Outcome: Progressing  8/26/2024 0511 by Nell Cisse RN  Outcome: Progressing     Problem: Chronic Conditions and Co-morbidities  Goal: Patient's chronic conditions and co-morbidity symptoms are monitored and maintained or improved  8/26/2024 1844 by Kathia Rios RN  Outcome: Progressing  8/26/2024 0511 by Nell Cisse RN  Outcome: Progressing     Problem: Pain  Goal: Verbalizes/displays adequate comfort level or baseline comfort level  8/26/2024 1844 by Kathia Rios RN  Outcome: Progressing  8/26/2024 0511 by Nell Cisse RN  Outcome: Progressing     Problem: Safety - Adult  Goal: Free from fall injury  8/26/2024 1844 by Kathia Rios RN  Outcome: Progressing  8/26/2024 0511 by Nell Cisse RN  Outcome: Progressing  Flowsheets (Taken 8/25/2024 1626 by Holly Sutton RN)  Free From Fall Injury: Instruct family/caregiver on patient safety     Problem: ABCDS Injury Assessment  Goal: Absence of physical injury  8/26/2024 1844 by Kathia Rios RN  Outcome: Progressing  8/26/2024 0511 by Nell Cisse RN  Outcome: Progressing  Flowsheets (Taken 8/25/2024 1626 by Holly Sutton RN)  Absence of Physical Injury: Implement safety measures based on patient assessment     Problem: Skin/Tissue Integrity - Adult  Goal: Skin integrity remains intact  8/26/2024 1844 by Kathia Rios RN  Outcome: Progressing  8/26/2024 0511 by Nell Cisse RN  Outcome: Progressing  Flowsheets (Taken 8/25/2024 1626 by Holly Sutton RN)  Skin Integrity Remains Intact: Monitor for areas of redness and/or skin breakdown  Goal: Incisions, wounds, or drain sites healing without S/S of infection  8/26/2024 1844 by Kathia Rios RN  Outcome: Progressing  8/26/2024 0511 by Nell Cisse RN  Outcome: Progressing  Flowsheets (Taken 8/25/2024 1626 by

## 2024-08-26 NOTE — PROGRESS NOTES
[facility] Santacroce, Khan, Mostafa, Buck, Murtagh Jr  Urology Progress Note     Chief Complaint: Status post left radical nephrectomy 8/20/24    Subjective:  Nothing acute overnight  Continues to do well, feels stronger today  Walked in hallways yesterday, sat in chair for multiple hours  Continues to tolerate diet and fluids without any issues  Passing flatus    Patient Vitals for the past 24 hrs:   BP Temp Temp src Pulse Resp SpO2   08/26/24 0400 134/75 98.2 °F (36.8 °C) Oral 80 18 93 %   08/26/24 0000 129/82 98.6 °F (37 °C) Oral 84 18 93 %   08/25/24 1944 134/75 98.8 °F (37.1 °C) Oral -- 18 --   08/25/24 1619 136/79 98.9 °F (37.2 °C) Oral -- 16 --   08/25/24 1618 -- -- -- 74 -- 94 %   08/25/24 1325 119/70 -- -- 68 -- (!) 88 %   08/25/24 1245 (!) 88/54 -- -- 74 -- 94 %   08/25/24 1200 123/69 98.6 °F (37 °C) -- -- -- 91 %   08/25/24 0830 116/75 98.6 °F (37 °C) Oral 73 17 --       Intake/Output Summary (Last 24 hours) at 8/26/2024 0731  Last data filed at 8/25/2024 1840  Gross per 24 hour   Intake 508.88 ml   Output 800 ml   Net -291.12 ml       Recent Labs     08/25/24  1137   WBC 7.1   HGB 11.0*   HCT 31.7*   MCV 90.8          Recent Labs     08/25/24  1137      K 4.7      CO2 27   BUN 14   CREATININE 2.2*         No results for input(s): \"COLORU\", \"PHUR\", \"LABCAST\", \"WBCUA\", \"RBCUA\", \"MUCUS\", \"TRICHOMONAS\", \"YEAST\", \"BACTERIA\", \"CLARITYU\", \"SPECGRAV\", \"LEUKOCYTESUR\", \"UROBILINOGEN\", \"BILIRUBINUR\", \"BLOODU\" in the last 72 hours.    Invalid input(s): \"NITRATE\", \"GLUCOSEUKETONESUAMORPHOUS\"    Additional Lab/culture results:  None    Physical Exam:  Constitutional: Patient in no acute distress.  Neuro: alert and oriented to person place and time.  HEENT: No acute abnormalities  Lungs: Respiratory effort normal on room air  Cardiovascular:  Heart rate regular rate and rhythm  Abdomen: Soft, appropriately tender, mild distention and tympanic. Non peritonitic.  Incisions clean dry and  intact  Extremities: No leg swelling, no edema  : Pittman catheter in place draining clear yellow urine    Interval Imaging Findings:  None    Impression:  79-year-old male, status post left robotic radical nephrectomy on 8/19    Plan:  -Regular diet  -Follow-up morning labs  -Maintain Pittman catheter for now - VT prior to discharge  -Continue Flomax 0.4 mg daily  -Appreciate nephrology recommendations for postoperative creatinine elevation, they did sign off  -PT/OT evaluation ordered  -PM&R consulted, recommended acute rehab, awaiting precert  -Incentive spirometry 10 times per hour while awake  -Continue to hold anticoagulation for now  -DVT prophylaxis: SCD cuffs, 5000u SQH, consider restarting baby aspirin today  -Disposition: Patient cleared for discharge pending acute rehab acceptance, precert sent 8/23/24    Rocco Power MD  Urology Resident, PGY-4

## 2024-08-26 NOTE — PROGRESS NOTES
Tio Childers Santacroce, Khan, Mostafa, Buck, Murtagh Jr  Urology Progress Note     Chief Complaint: Status post left radical nephrectomy 8/20/24    Subjective:  Nothing acute overnight  Continues to improve clinically  Continues to tolerate diet and fluids without any issues  Passing flatus    Patient Vitals for the past 24 hrs:   BP Temp Temp src Pulse Resp SpO2   08/26/24 0400 134/75 98.2 °F (36.8 °C) Oral 80 18 93 %   08/26/24 0000 129/82 98.6 °F (37 °C) Oral 84 18 93 %   08/25/24 1944 134/75 98.8 °F (37.1 °C) Oral -- 18 --   08/25/24 1619 136/79 98.9 °F (37.2 °C) Oral -- 16 --   08/25/24 1618 -- -- -- 74 -- 94 %   08/25/24 1325 119/70 -- -- 68 -- (!) 88 %   08/25/24 1245 (!) 88/54 -- -- 74 -- 94 %   08/25/24 1200 123/69 98.6 °F (37 °C) -- -- -- 91 %   08/25/24 0830 116/75 98.6 °F (37 °C) Oral 73 17 --       Intake/Output Summary (Last 24 hours) at 8/26/2024 0733  Last data filed at 8/25/2024 1840  Gross per 24 hour   Intake 508.88 ml   Output 800 ml   Net -291.12 ml       Recent Labs     08/25/24  1137   WBC 7.1   HGB 11.0*   HCT 31.7*   MCV 90.8          Recent Labs     08/25/24  1137      K 4.7      CO2 27   BUN 14   CREATININE 2.2*         No results for input(s): \"COLORU\", \"PHUR\", \"LABCAST\", \"WBCUA\", \"RBCUA\", \"MUCUS\", \"TRICHOMONAS\", \"YEAST\", \"BACTERIA\", \"CLARITYU\", \"SPECGRAV\", \"LEUKOCYTESUR\", \"UROBILINOGEN\", \"BILIRUBINUR\", \"BLOODU\" in the last 72 hours.    Invalid input(s): \"NITRATE\", \"GLUCOSEUKETONESUAMORPHOUS\"    Additional Lab/culture results:  None    Physical Exam:  Constitutional: Patient in no acute distress.  Neuro: alert and oriented to person place and time.  HEENT: No acute abnormalities  Lungs: Respiratory effort normal on room air  Cardiovascular:  Heart rate regular rate and rhythm  Abdomen: Soft, appropriately tender, mild distention and tympanic. Non peritonitic.  Incisions clean dry and intact  Extremities: No leg swelling, no edema  : Pittman catheter in place

## 2024-08-27 VITALS
SYSTOLIC BLOOD PRESSURE: 137 MMHG | HEIGHT: 74 IN | RESPIRATION RATE: 16 BRPM | BODY MASS INDEX: 25.41 KG/M2 | HEART RATE: 78 BPM | TEMPERATURE: 98.5 F | OXYGEN SATURATION: 95 % | DIASTOLIC BLOOD PRESSURE: 79 MMHG | WEIGHT: 198 LBS

## 2024-08-27 LAB
ANION GAP SERPL CALCULATED.3IONS-SCNC: 12 MMOL/L (ref 9–16)
BUN SERPL-MCNC: 14 MG/DL (ref 8–23)
CALCIUM SERPL-MCNC: 8.8 MG/DL (ref 8.6–10.4)
CHLORIDE SERPL-SCNC: 103 MMOL/L (ref 98–107)
CO2 SERPL-SCNC: 20 MMOL/L (ref 20–31)
CREAT SERPL-MCNC: 2.1 MG/DL (ref 0.7–1.2)
ERYTHROCYTE [DISTWIDTH] IN BLOOD BY AUTOMATED COUNT: 14.8 % (ref 11.8–14.4)
GFR, ESTIMATED: 31 ML/MIN/1.73M2
GLUCOSE BLD-MCNC: 100 MG/DL (ref 75–110)
GLUCOSE BLD-MCNC: 113 MG/DL (ref 75–110)
GLUCOSE BLD-MCNC: 68 MG/DL (ref 75–110)
GLUCOSE BLD-MCNC: 79 MG/DL (ref 75–110)
GLUCOSE BLD-MCNC: 83 MG/DL (ref 75–110)
GLUCOSE SERPL-MCNC: 112 MG/DL (ref 74–99)
HCT VFR BLD AUTO: 28.1 % (ref 40.7–50.3)
HGB BLD-MCNC: 9.4 G/DL (ref 13–17)
MCH RBC QN AUTO: 30.7 PG (ref 25.2–33.5)
MCHC RBC AUTO-ENTMCNC: 33.5 G/DL (ref 28.4–34.8)
MCV RBC AUTO: 91.8 FL (ref 82.6–102.9)
NRBC BLD-RTO: 0 PER 100 WBC
PLATELET # BLD AUTO: 227 K/UL (ref 138–453)
PMV BLD AUTO: 10 FL (ref 8.1–13.5)
POTASSIUM SERPL-SCNC: 4 MMOL/L (ref 3.7–5.3)
RBC # BLD AUTO: 3.06 M/UL (ref 4.21–5.77)
SODIUM SERPL-SCNC: 135 MMOL/L (ref 136–145)
WBC OTHER # BLD: 6.7 K/UL (ref 3.5–11.3)

## 2024-08-27 PROCEDURE — 85027 COMPLETE CBC AUTOMATED: CPT

## 2024-08-27 PROCEDURE — 6360000002 HC RX W HCPCS: Performed by: STUDENT IN AN ORGANIZED HEALTH CARE EDUCATION/TRAINING PROGRAM

## 2024-08-27 PROCEDURE — 6370000000 HC RX 637 (ALT 250 FOR IP): Performed by: INTERNAL MEDICINE

## 2024-08-27 PROCEDURE — 6370000000 HC RX 637 (ALT 250 FOR IP)

## 2024-08-27 PROCEDURE — 6370000000 HC RX 637 (ALT 250 FOR IP): Performed by: UROLOGY

## 2024-08-27 PROCEDURE — 82947 ASSAY GLUCOSE BLOOD QUANT: CPT

## 2024-08-27 PROCEDURE — 80048 BASIC METABOLIC PNL TOTAL CA: CPT

## 2024-08-27 PROCEDURE — 36415 COLL VENOUS BLD VENIPUNCTURE: CPT

## 2024-08-27 PROCEDURE — 51798 US URINE CAPACITY MEASURE: CPT

## 2024-08-27 PROCEDURE — 2580000003 HC RX 258

## 2024-08-27 RX ORDER — ASPIRIN 81 MG/1
81 TABLET ORAL DAILY
Status: DISCONTINUED | OUTPATIENT
Start: 2024-08-27 | End: 2024-08-27 | Stop reason: HOSPADM

## 2024-08-27 RX ORDER — OXYCODONE HYDROCHLORIDE 5 MG/1
5 TABLET ORAL EVERY 6 HOURS PRN
Qty: 5 TABLET | Refills: 0 | Status: SHIPPED | OUTPATIENT
Start: 2024-08-27 | End: 2024-09-01

## 2024-08-27 RX ADMIN — HEPARIN SODIUM 5000 UNITS: 5000 INJECTION INTRAVENOUS; SUBCUTANEOUS at 08:13

## 2024-08-27 RX ADMIN — GABAPENTIN 300 MG: 600 TABLET, FILM COATED ORAL at 08:12

## 2024-08-27 RX ADMIN — TAMSULOSIN HYDROCHLORIDE 0.4 MG: 0.4 CAPSULE ORAL at 08:11

## 2024-08-27 RX ADMIN — SODIUM BICARBONATE 650 MG: 650 TABLET ORAL at 08:11

## 2024-08-27 RX ADMIN — Medication 16 G: at 16:17

## 2024-08-27 RX ADMIN — SODIUM CHLORIDE, PRESERVATIVE FREE 10 ML: 5 INJECTION INTRAVENOUS at 08:24

## 2024-08-27 RX ADMIN — GABAPENTIN 300 MG: 600 TABLET, FILM COATED ORAL at 13:43

## 2024-08-27 RX ADMIN — DOCUSATE SODIUM 100 MG: 100 CAPSULE, LIQUID FILLED ORAL at 08:11

## 2024-08-27 RX ADMIN — METHOCARBAMOL 500 MG: 500 TABLET ORAL at 08:11

## 2024-08-27 RX ADMIN — HEPARIN SODIUM 5000 UNITS: 5000 INJECTION INTRAVENOUS; SUBCUTANEOUS at 13:43

## 2024-08-27 RX ADMIN — ASPIRIN 81 MG: 81 TABLET, COATED ORAL at 08:17

## 2024-08-27 RX ADMIN — INSULIN GLARGINE 20 UNITS: 100 INJECTION, SOLUTION SUBCUTANEOUS at 08:13

## 2024-08-27 RX ADMIN — METHOCARBAMOL 500 MG: 500 TABLET ORAL at 13:44

## 2024-08-27 RX ADMIN — SERTRALINE HYDROCHLORIDE 100 MG: 50 TABLET ORAL at 08:12

## 2024-08-27 RX ADMIN — METOPROLOL TARTRATE 12.5 MG: 25 TABLET, FILM COATED ORAL at 08:12

## 2024-08-27 ASSESSMENT — PAIN SCALES - GENERAL
PAINLEVEL_OUTOF10: 10
PAINLEVEL_OUTOF10: 0
PAINLEVEL_OUTOF10: 0

## 2024-08-27 NOTE — PLAN OF CARE
Problem: Discharge Planning  Goal: Discharge to home or other facility with appropriate resources  8/27/2024 0549 by eNll Cisse RN  Outcome: Progressing  8/26/2024 1844 by Kathia Rios RN  Outcome: Progressing     Problem: Chronic Conditions and Co-morbidities  Goal: Patient's chronic conditions and co-morbidity symptoms are monitored and maintained or improved  8/27/2024 0549 by Nell Cisse RN  Outcome: Progressing  8/26/2024 1844 by Kathia Rios RN  Outcome: Progressing     Problem: Pain  Goal: Verbalizes/displays adequate comfort level or baseline comfort level  8/27/2024 0549 by Nell Cisse RN  Outcome: Progressing  8/26/2024 1844 by Kathia Rios RN  Outcome: Progressing     Problem: Safety - Adult  Goal: Free from fall injury  8/27/2024 0549 by Nell Cisse RN  Outcome: Progressing  8/26/2024 1844 by Kathia Rios RN  Outcome: Progressing  Flowsheets (Taken 8/26/2024 1844)  Free From Fall Injury: Instruct family/caregiver on patient safety     Problem: ABCDS Injury Assessment  Goal: Absence of physical injury  8/27/2024 0549 by Nell Cisse RN  Outcome: Progressing  8/26/2024 1844 by Kathia Rios RN  Outcome: Progressing  Flowsheets (Taken 8/26/2024 1844)  Absence of Physical Injury: Implement safety measures based on patient assessment     Problem: Skin/Tissue Integrity - Adult  Goal: Skin integrity remains intact  8/27/2024 0549 by Nell Cisse RN  Outcome: Progressing  8/26/2024 1844 by Kathia Rios RN  Outcome: Progressing  Flowsheets (Taken 8/26/2024 1844)  Skin Integrity Remains Intact: Monitor for areas of redness and/or skin breakdown  Goal: Incisions, wounds, or drain sites healing without S/S of infection  8/27/2024 0549 by Nell Cisse RN  Outcome: Progressing  8/26/2024 1844 by Kathia Rios RN  Outcome: Progressing  Flowsheets (Taken 8/26/2024 1844)  Incisions, Wounds, or Drain Sites Healing Without Sign and Symptoms of Infection:  Progressing  8/26/2024 1844 by Kathia Rios, RN  Outcome: Progressing

## 2024-08-27 NOTE — PLAN OF CARE
Problem: Discharge Planning  Goal: Discharge to home or other facility with appropriate resources  8/27/2024 1632 by Lorraine Hagen RN  Outcome: Progressing  8/27/2024 0549 by Nell Cisse RN  Outcome: Progressing     Problem: Chronic Conditions and Co-morbidities  Goal: Patient's chronic conditions and co-morbidity symptoms are monitored and maintained or improved  8/27/2024 1632 by Lorraine Hagen RN  Outcome: Progressing  8/27/2024 0549 by Nell Cisse RN  Outcome: Progressing     Problem: Pain  Goal: Verbalizes/displays adequate comfort level or baseline comfort level  8/27/2024 1632 by Lorraine Hagen RN  Outcome: Progressing  8/27/2024 0549 by Nell Cisse RN  Outcome: Progressing     Problem: Safety - Adult  Goal: Free from fall injury  8/27/2024 1632 by Lorraine Hagen RN  Outcome: Progressing  8/27/2024 0549 by Nell Cisse RN  Outcome: Progressing     Problem: ABCDS Injury Assessment  Goal: Absence of physical injury  8/27/2024 1632 by Lorraine Hagen RN  Outcome: Progressing  8/27/2024 0549 by Nell Cisse RN  Outcome: Progressing     Problem: Skin/Tissue Integrity - Adult  Goal: Skin integrity remains intact  8/27/2024 1632 by Lorraine Hagen RN  Outcome: Progressing  8/27/2024 0549 by Nell Cisse RN  Outcome: Progressing  Goal: Incisions, wounds, or drain sites healing without S/S of infection  8/27/2024 1632 by Lorraine Hagen RN  Outcome: Progressing  8/27/2024 0549 by Nell Cisse RN  Outcome: Progressing  Goal: Oral mucous membranes remain intact  8/27/2024 1632 by Lorraine Hagen RN  Outcome: Progressing  8/27/2024 0549 by Nell Cisse RN  Outcome: Progressing     Problem: Musculoskeletal - Adult  Goal: Return mobility to safest level of function  8/27/2024 1632 by Lorraine Hagen RN  Outcome: Progressing  8/27/2024 0549 by Nell Cisse RN  Outcome: Progressing  Goal: Maintain proper alignment of affected body part  8/27/2024 1632 by Lorraine Hagen RN  Outcome:

## 2024-08-27 NOTE — DISCHARGE INSTR - COC
Continuity of Care Form    Patient Name: Randy Caputo   :  1945  MRN:  [id number]    Admit date:  2024  Discharge date:  24    Code Status Order: Full Code   Advance Directives:   Advance Care Flowsheet Documentation             Admitting Physician:  Chester Hargrove MD  PCP: Humberto Craig MD    Discharging Nurse: Marbella Walker  Discharging Hospital Unit/Room#: 0447/0447-01  Discharging Unit Phone Number: [phone]    Emergency Contact:   Extended Emergency Contact Information  Primary Emergency Contact: Lisa Caputo  Mobile Phone: [phone]  Relation: Spouse    Past Surgical History:  Past Surgical History:   Procedure Laterality Date    APPENDECTOMY      CARDIAC CATHETERIZATION      1 stent    COLONOSCOPY      CYSTOSCOPY      CYSTOSCOPY Left 2024    CYSTOSCOPY STENT REMOVAL performed by Chester Hargrove MD at [facility] OR    FOOT SURGERY Right 2023    partial right fourth metatarsal excision and bone biopsy    KIDNEY SURGERY N/A 2024    XI ROBOTIC LAPAROSCOPIC RADICAL NEPHRECTOMY performed by Chester Hargrove MD at [facility] OR    TONSILLECTOMY      TOTAL NEPHRECTOMY Left 2024    XI ROBOTIC LAPAROSCOPIC RADICAL NEPHRECTOMY (Kidney)      CYSTOSCOPY STENT REMOVAL (Left: Kidney)       Immunization History:   Immunization History   Administered Date(s) Administered    COVID-19, MODERNA Meng, (age 12y+), IM, 50 mcg/0.5 mL 10/21/2022       Active Problems:  Patient Active Problem List   Diagnosis Code    Left kidney mass N28.89       Isolation/Infection:   Isolation            No Isolation          Patient Infection Status       None to display            Nurse Assessment:  Last Vital Signs: BP (!) 156/84   Pulse 78   Temp 98.8 °F (37.1 °C) (Oral)   Resp 17   Ht 1.88 m (6' 2.02\")   Wt 89.8 kg (198 lb)   SpO2 97%   BMI 25.41 kg/m²     Last documented pain score (0-10 scale): Pain Level: 10  Last Weight:   Wt Readings from Last 1 Encounters:   24 89.8 kg

## 2024-08-27 NOTE — PROGRESS NOTES
Tio Childers Santacroce, Khan, Mostafa, Buck, Murtagh Jr  Urology Progress Note     Chief Complaint: Status post left radical nephrectomy 8/20/24    Subjective:  Nothing acute overnight  Continues to do better  Ambulating  Passing flatus    Patient Vitals for the past 24 hrs:   BP Temp Temp src Pulse Resp SpO2   08/27/24 0424 (!) 130/95 97.9 °F (36.6 °C) Oral 74 17 93 %   08/27/24 0000 (!) 153/86 98.4 °F (36.9 °C) Oral -- -- --   08/26/24 2043 (!) 143/86 -- -- -- -- --   08/26/24 2000 (!) 143/86 97.7 °F (36.5 °C) Oral 76 13 95 %   08/26/24 1528 99/88 98.9 °F (37.2 °C) Oral 81 17 92 %   08/26/24 0810 126/68 98.9 °F (37.2 °C) Oral 82 18 93 %       Intake/Output Summary (Last 24 hours) at 8/27/2024 0735  Last data filed at 8/27/2024 0700  Gross per 24 hour   Intake --   Output 1750 ml   Net -1750 ml       Recent Labs     08/25/24  1137 08/26/24  0852   WBC 7.1 6.5   HGB 11.0* 10.0*   HCT 31.7* 30.2*   MCV 90.8 95.6    248       Recent Labs     08/25/24  1137 08/26/24  0852    135*   K 4.7 4.2    104   CO2 27 22   BUN 14 14   CREATININE 2.2* 2.0*         No results for input(s): \"COLORU\", \"PHUR\", \"LABCAST\", \"WBCUA\", \"RBCUA\", \"MUCUS\", \"TRICHOMONAS\", \"YEAST\", \"BACTERIA\", \"CLARITYU\", \"SPECGRAV\", \"LEUKOCYTESUR\", \"UROBILINOGEN\", \"BILIRUBINUR\", \"BLOODU\" in the last 72 hours.    Invalid input(s): \"NITRATE\", \"GLUCOSEUKETONESUAMORPHOUS\"    Additional Lab/culture results:  None    Physical Exam:  Constitutional: Patient in no acute distress.  Neuro: alert and oriented to person place and time.  HEENT: No acute abnormalities  Lungs: Respiratory effort normal on room air  Cardiovascular:  Heart rate regular rate and rhythm  Abdomen: Soft, appropriately tender, mild distention and tympanic. Non peritonitic.  Incisions clean dry and intact  Extremities: No leg swelling, no edema  : Pittman catheter in place draining clear yellow urine    Interval Imaging Findings:  None    Impression:  79-year-old male, status

## 2024-08-27 NOTE — CARE COORDINATION
Received voice message from Miguelina with [facility] for  [facility]. Auth has been approved.    1035 [employer] message KLARISSA Arellano. Confirmed pt will be discharged today provided his post void residual is low.    1040 Faxed [facility] Transportation request with a 1330 .    1410 Received call from the [facility] with [facility] [place]. States she needs a written script for the oxycodone or it can be called into [phone], F [phone].    1437 [employer] message Thuy, requested Oxycodone script.    1511 Script faxed to [facility].

## 2024-08-29 NOTE — PROGRESS NOTES
Physician Progress Note      PATIENT:               RAMSES MENDIOLA  CSN #:                  [id number]  :                       1945  ADMIT DATE:       2024 10:06 AM  DISCH DATE:        2024 4:34 PM  RESPONDING  PROVIDER #:        Gabe Mayo          QUERY TEXT:    Pt admitted with left kidney mass and noted to have surgical pathology   consistent with Renal cell carcinoma, clear-cell type, grade 2, Margins are   free of tumor. If possible, please document in progress notes and d/c summary   a correlating diagnosis to explain pathology findings:    The medical record reflects the following:  Risk Factors: renal mass  Clinical Indicators:  surgical pathology consistent with Renal cell carcinoma,   clear-cell type, grade 2, Margins are free of tumor.  Treatment: radical nephrectomy, monitor pain and vital signs.    Thank you for your time, Juliette SHAW, RN CDS. If you have questions, please   call [phone] ([phone]).  Options provided:  -- Renal cell carcinoma (unclear margins)  -- Renal cell carcinoma (clear margins)  -- Other - I will add my own diagnosis  -- Disagree - Not applicable / Not valid  -- Disagree - Clinically unable to determine / Unknown  -- Refer to Clinical Documentation Reviewer    PROVIDER RESPONSE TEXT:    This patient had a left renal mass consistent with renal cell carcinoma.    Query created by: Juliette Raygoza on 2024 10:39 AM      Electronically signed by:  Gabe Mayo 2024 7:30 AM

## 2024-08-31 NOTE — DISCHARGE SUMMARY
Urology Discharge Summary    DISCHARGE NOTE / SUMMARY       Demographics:  Patient Name: Randy Caputo : 1945   MRN: [id number]    DATE OF ADMISSION: 2024  DATE OF DISCHARGE: 2024  DISCHARGE DIAGNOSES:   Principal Problem:    Left kidney mass  Resolved Problems:    * No resolved hospital problems. *    CONSULTS: Nephrology and PM&R  PROCEDURES PERFORMED: Cystoscopy, left ureteral stent removal, left robotic radical nephrectomy    HOSPITAL COURSE SUMMARY:     Per HPI:  Patient is a 79-year-old male who has a history of hematuria and abdominal pain.  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  After the risk, benefits, and alternatives, the patient provided consent for the above described seizure.  Patient was taken back to the OR on day of admission.  Patient tolerated the procedure well and his x-ray at the end of the case.  Please refer to op note for further details.    Patient was admitted to the floor with a Pittman catheter in place.  On postop day 1, his Pittman catheter was removed.  His creatinine had a noticeable increase and nephrology was consulted for that matter.  However on the night of postop day 1, patient failed void trial and a Pittman catheter was replaced.  Due to some deconditioning after surgery, physical medicine rehabilitation was consulted on postop day 2 for possible placement in an acute rehab facility.  Patient's recovery was then unremarkable and eventually he was discharged pending acute rehab acceptance on postop day 8.    Exam:  /79   Pulse 78   Temp 98.5 °F (36.9 °C) (Oral)   Resp 16   Ht 1.88 m (6' 2.02\")   Wt 89.8 kg (198 lb)   SpO2 95%   BMI 25.41 kg/m²   No intake or output data in the 24 hours ending 24 0637    Constitutional: Patient in no acute distress.  Neuro: alert and oriented to person place and time.  HEENT: No acute abnormalities  Lungs:  tablet  Commonly known as: LOPRESSOR     nitroGLYCERIN 0.4 MG SL tablet  Commonly known as: NITROSTAT     prasugrel 10 MG Tabs  Commonly known as: EFFIENT     sertraline 50 MG tablet  Commonly known as: ZOLOFT     Trulicity 0.75 MG/0.5ML Sopn SC injection  Generic drug: dulaglutide               Where to Get Your Medications        These medications were sent to [facility]/pharmacy #[id number] - [place], [place] - [address] P [phone] - F [phone]  [address] [place] [address]      Phone: [phone]   docusate sodium 100 MG capsule       You can get these medications from any pharmacy    Bring a paper prescription for each of these medications  oxyCODONE 5 MG immediate release tablet         Follow-up:   No future appointments.  [unfilled]    Time Spent Discharging Patient: 31 minutes       Gabe Mayo MD, MD  Urology Resident, PGY-3

## 2024-10-26 LAB — SURGICAL PATHOLOGY REPORT: NORMAL

## (undated) DEVICE — APPLICATOR MEDICATED 26 CC SOLUTION HI LT ORNG CHLORAPREP

## (undated) DEVICE — TRI-LUMEN FILTERED TUBE SET WITH ACTIVATED CHARCOAL FILTER: Brand: AIRSEAL

## (undated) DEVICE — AIRSEAL 12 MM ACCESS PORT AND PALM GRIP OBTURATOR WITH BLADELESS OPTICAL TIP, 120 MM LENGTH: Brand: AIRSEAL

## (undated) DEVICE — TROCAR: Brand: KII® SLEEVE

## (undated) DEVICE — ELECTRO LUBE IS A SINGLE PATIENT USE DEVICE THAT IS INTENDED TO BE USED ON ELECTROSURGICAL ELECTRODES TO REDUCE STICKING.: Brand: KEY SURGICAL ELECTRO LUBE

## (undated) DEVICE — AGENT HEMSTAT 3GM OXIDIZED REGENERATED CELOS ABSRB FOR CONT (ORDER MULTIPLES OF 5EA)

## (undated) DEVICE — BAG SPEC LAP 9X7.5 IN 12 MM 1500 CC MEM WIRE CANN NYL

## (undated) DEVICE — SYRINGE MED 10ML LUERLOCK TIP W/O SFTY DISP

## (undated) DEVICE — SEAL

## (undated) DEVICE — SUTURE VICRYL SZ 0 L18IN ABSRB VLT L36MM CT-1 1/2 CIR J740D

## (undated) DEVICE — SUTURE MONOCRYL SZ 4-0 L18IN ABSRB UD L16MM PC-3 3/8 CIR PRIM Y845G

## (undated) DEVICE — Device

## (undated) DEVICE — TROCAR: Brand: KII FIOS FIRST ENTRY

## (undated) DEVICE — ARM DRAPE

## (undated) DEVICE — STAPLER INT L44CM 12 FIRING B FRM PWR + W/ GRIPPING SURF

## (undated) DEVICE — SOLUTION ANTIFOG VIS SYS CLEARIFY LAPSCP

## (undated) DEVICE — 1LYRTR 16FR10ML100%SIL UMS SNP: Brand: MEDLINE INDUSTRIES, INC.

## (undated) DEVICE — HYPODERMIC SAFETY NEEDLE: Brand: MAGELLAN

## (undated) DEVICE — GLOVE ORANGE PI 7 1/2   MSG9075

## (undated) DEVICE — SUTURE PDS II SZ 0 L60IN ABSRB VLT L65MM TP-1 1/2 CIR Z991G

## (undated) DEVICE — GLOVE ORTHO 7 1/2   MSG9475

## (undated) DEVICE — BLADE,CARBON-STEEL,15,STRL,DISPOSABLE,TB: Brand: MEDLINE

## (undated) DEVICE — STRAP ARMBRD W1.5XL32IN FOAM STR YET SFT W/ HK AND LOOP

## (undated) DEVICE — PROTECTOR ULN NRV PUR FOAM HK LOOP STRP ANATOMICALLY

## (undated) DEVICE — 3M™ IOBAN™ 2 ANTIMICROBIAL INCISE DRAPE 6650EZ: Brand: IOBAN™ 2

## (undated) DEVICE — PACK PROCEDURE SURG ROBOTIC KID SVMMC

## (undated) DEVICE — RELOAD STPL L60MM H1-2.6MM MESENTERY THN TISS WHT 6 ROW

## (undated) DEVICE — DRAPE,REIN 53X77,STERILE: Brand: MEDLINE

## (undated) DEVICE — SCISSOR SURG METZ CRV TIP

## (undated) DEVICE — SUTURE VICRYL SZ 0 L27IN ABSRB UD L36MM CT-1 1/2 CIR J260H

## (undated) DEVICE — AGENT HEMOSTATIC SURG ORIGINAL ABS 2X14IN LOOSE KNIT 12/CA

## (undated) DEVICE — COVER,LIGHT HANDLE,FLX,2/PK: Brand: MEDLINE INDUSTRIES, INC.

## (undated) DEVICE — INSUFFLATION NEEDLE TO ESTABLISH PNEUMOPERITONEUM.: Brand: INSUFFLATION NEEDLE

## (undated) DEVICE — PACK PROCEDURE SURG CYSTO SVMMC LF

## (undated) DEVICE — COUNTER NDL 10 COUNT HLD 20 FOAM BLK SGL MAG

## (undated) DEVICE — STANDARD HYPODERMIC NEEDLE,ALUMINUM HUB: Brand: MONOJECT

## (undated) DEVICE — TOWEL,OR,DSP,ST,NATURAL,DLX,4/PK,20PK/CS: Brand: MEDLINE

## (undated) DEVICE — GARMENT,MEDLINE,DVT,INT,CALF,MED, GEN2: Brand: MEDLINE

## (undated) DEVICE — INSUFFLATION TUBING SET WITH FILTER, FUNNEL CONNECTOR AND LUER LOCK: Brand: JOSNOE MEDICAL INC